# Patient Record
Sex: FEMALE | Race: ASIAN | NOT HISPANIC OR LATINO
[De-identification: names, ages, dates, MRNs, and addresses within clinical notes are randomized per-mention and may not be internally consistent; named-entity substitution may affect disease eponyms.]

---

## 2017-02-08 ENCOUNTER — APPOINTMENT (OUTPATIENT)
Dept: CARDIOLOGY | Facility: CLINIC | Age: 82
End: 2017-02-08

## 2017-02-08 ENCOUNTER — NON-APPOINTMENT (OUTPATIENT)
Age: 82
End: 2017-02-08

## 2017-02-08 VITALS
RESPIRATION RATE: 17 BRPM | SYSTOLIC BLOOD PRESSURE: 117 MMHG | OXYGEN SATURATION: 99 % | HEART RATE: 111 BPM | WEIGHT: 128 LBS | TEMPERATURE: 97.7 F | HEIGHT: 61 IN | BODY MASS INDEX: 24.17 KG/M2 | DIASTOLIC BLOOD PRESSURE: 78 MMHG

## 2017-02-28 ENCOUNTER — NON-APPOINTMENT (OUTPATIENT)
Age: 82
End: 2017-02-28

## 2017-02-28 ENCOUNTER — APPOINTMENT (OUTPATIENT)
Dept: CARDIOLOGY | Facility: CLINIC | Age: 82
End: 2017-02-28

## 2017-02-28 ENCOUNTER — MESSAGE (OUTPATIENT)
Age: 82
End: 2017-02-28

## 2017-02-28 VITALS
HEART RATE: 70 BPM | RESPIRATION RATE: 17 BRPM | WEIGHT: 124 LBS | TEMPERATURE: 97.9 F | OXYGEN SATURATION: 99 % | BODY MASS INDEX: 23.43 KG/M2 | DIASTOLIC BLOOD PRESSURE: 63 MMHG | SYSTOLIC BLOOD PRESSURE: 98 MMHG

## 2017-03-03 ENCOUNTER — OTHER (OUTPATIENT)
Age: 82
End: 2017-03-03

## 2017-04-17 ENCOUNTER — APPOINTMENT (OUTPATIENT)
Dept: CARDIOLOGY | Facility: CLINIC | Age: 82
End: 2017-04-17

## 2017-04-17 VITALS
HEART RATE: 74 BPM | BODY MASS INDEX: 24.17 KG/M2 | SYSTOLIC BLOOD PRESSURE: 106 MMHG | WEIGHT: 128 LBS | TEMPERATURE: 97.7 F | RESPIRATION RATE: 17 BRPM | DIASTOLIC BLOOD PRESSURE: 66 MMHG | HEIGHT: 61 IN | OXYGEN SATURATION: 100 %

## 2017-05-01 ENCOUNTER — MEDICATION RENEWAL (OUTPATIENT)
Age: 82
End: 2017-05-01

## 2017-06-06 ENCOUNTER — MEDICATION RENEWAL (OUTPATIENT)
Age: 82
End: 2017-06-06

## 2017-07-05 ENCOUNTER — APPOINTMENT (OUTPATIENT)
Dept: CARDIOLOGY | Facility: CLINIC | Age: 82
End: 2017-07-05

## 2017-07-05 VITALS
BODY MASS INDEX: 23.43 KG/M2 | OXYGEN SATURATION: 98 % | TEMPERATURE: 97.9 F | SYSTOLIC BLOOD PRESSURE: 109 MMHG | RESPIRATION RATE: 18 BRPM | WEIGHT: 124 LBS | HEART RATE: 63 BPM | DIASTOLIC BLOOD PRESSURE: 67 MMHG

## 2017-10-07 ENCOUNTER — APPOINTMENT (OUTPATIENT)
Dept: CARDIOLOGY | Facility: CLINIC | Age: 82
End: 2017-10-07
Payer: MEDICARE

## 2017-10-07 VITALS
OXYGEN SATURATION: 98 % | BODY MASS INDEX: 23.43 KG/M2 | TEMPERATURE: 97.5 F | WEIGHT: 124 LBS | RESPIRATION RATE: 18 BRPM | HEART RATE: 66 BPM | SYSTOLIC BLOOD PRESSURE: 98 MMHG | DIASTOLIC BLOOD PRESSURE: 62 MMHG

## 2017-10-07 PROCEDURE — 99214 OFFICE O/P EST MOD 30 MIN: CPT

## 2017-10-16 ENCOUNTER — MEDICATION RENEWAL (OUTPATIENT)
Age: 82
End: 2017-10-16

## 2018-02-10 ENCOUNTER — APPOINTMENT (OUTPATIENT)
Dept: CARDIOLOGY | Facility: CLINIC | Age: 83
End: 2018-02-10
Payer: MEDICARE

## 2018-02-10 ENCOUNTER — NON-APPOINTMENT (OUTPATIENT)
Age: 83
End: 2018-02-10

## 2018-02-10 VITALS
DIASTOLIC BLOOD PRESSURE: 69 MMHG | OXYGEN SATURATION: 98 % | RESPIRATION RATE: 17 BRPM | WEIGHT: 128 LBS | SYSTOLIC BLOOD PRESSURE: 108 MMHG | BODY MASS INDEX: 24.19 KG/M2 | TEMPERATURE: 97.9 F | HEART RATE: 82 BPM

## 2018-02-10 PROCEDURE — 99214 OFFICE O/P EST MOD 30 MIN: CPT

## 2018-02-10 PROCEDURE — 93000 ELECTROCARDIOGRAM COMPLETE: CPT

## 2018-02-10 PROCEDURE — 93306 TTE W/DOPPLER COMPLETE: CPT

## 2018-02-10 RX ORDER — ALENDRONATE SODIUM 70 MG/1
70 TABLET ORAL
Qty: 12 | Refills: 0 | Status: ACTIVE | COMMUNITY
Start: 2017-11-03

## 2018-06-12 ENCOUNTER — APPOINTMENT (OUTPATIENT)
Dept: CARDIOLOGY | Facility: CLINIC | Age: 83
End: 2018-06-12
Payer: MEDICARE

## 2018-06-12 VITALS
RESPIRATION RATE: 17 BRPM | TEMPERATURE: 97.5 F | SYSTOLIC BLOOD PRESSURE: 107 MMHG | BODY MASS INDEX: 24.19 KG/M2 | WEIGHT: 128 LBS | OXYGEN SATURATION: 100 % | HEART RATE: 83 BPM | DIASTOLIC BLOOD PRESSURE: 69 MMHG

## 2018-06-12 PROCEDURE — 99214 OFFICE O/P EST MOD 30 MIN: CPT

## 2018-10-09 ENCOUNTER — APPOINTMENT (OUTPATIENT)
Dept: CARDIOLOGY | Facility: CLINIC | Age: 83
End: 2018-10-09
Payer: MEDICARE

## 2018-10-09 ENCOUNTER — NON-APPOINTMENT (OUTPATIENT)
Age: 83
End: 2018-10-09

## 2018-10-09 VITALS
SYSTOLIC BLOOD PRESSURE: 108 MMHG | DIASTOLIC BLOOD PRESSURE: 68 MMHG | HEART RATE: 67 BPM | OXYGEN SATURATION: 96 % | RESPIRATION RATE: 17 BRPM | WEIGHT: 130 LBS | TEMPERATURE: 98.1 F | BODY MASS INDEX: 24.56 KG/M2

## 2018-10-09 PROCEDURE — 93000 ELECTROCARDIOGRAM COMPLETE: CPT

## 2018-10-09 PROCEDURE — 99214 OFFICE O/P EST MOD 30 MIN: CPT

## 2019-02-09 NOTE — REASON FOR VISIT
[Follow-Up - Clinic] : a clinic follow-up of [Atrial Fibrillation] : atrial fibrillation [Coronary Artery Disease] : coronary artery disease [Heart Failure] : congestive heart failure [Prosthetic Valve] : a prosthetic valve

## 2019-02-13 ENCOUNTER — APPOINTMENT (OUTPATIENT)
Dept: CARDIOLOGY | Facility: CLINIC | Age: 84
End: 2019-02-13
Payer: MEDICARE

## 2019-02-13 VITALS
SYSTOLIC BLOOD PRESSURE: 99 MMHG | DIASTOLIC BLOOD PRESSURE: 61 MMHG | RESPIRATION RATE: 16 BRPM | OXYGEN SATURATION: 100 % | WEIGHT: 131 LBS | BODY MASS INDEX: 24.75 KG/M2 | HEART RATE: 68 BPM

## 2019-02-13 PROCEDURE — 99214 OFFICE O/P EST MOD 30 MIN: CPT

## 2019-06-11 NOTE — PHYSICAL EXAM
[General Appearance - Well Developed] : well developed [Normal Appearance] : normal appearance [Well Groomed] : well groomed [General Appearance - Well Nourished] : well nourished [No Deformities] : no deformities [General Appearance - In No Acute Distress] : no acute distress [Normal Conjunctiva] : the conjunctiva exhibited no abnormalities [Eyelids - No Xanthelasma] : the eyelids demonstrated no xanthelasmas [Normal Oral Mucosa] : normal oral mucosa [No Oral Pallor] : no oral pallor [No Oral Cyanosis] : no oral cyanosis [Normal Jugular Venous A Waves Present] : normal jugular venous A waves present [Normal Jugular Venous V Waves Present] : normal jugular venous V waves present [No Jugular Venous Sandoval A Waves] : no jugular venous sandoval A waves [Respiration, Rhythm And Depth] : normal respiratory rhythm and effort [Exaggerated Use Of Accessory Muscles For Inspiration] : no accessory muscle use [Bibasilar Rales/Crackles] : bibasilar rales [Heart Sounds] : normal S1 and S2 [Arterial Pulses Normal] : the arterial pulses were normal [Edema] : no peripheral edema present [Tachycardic ___] : the heart rate was tachycardic at [unfilled] bpm [Abdomen Soft] : soft [Abdomen Tenderness] : non-tender [Abdomen Mass (___ Cm)] : no abdominal mass palpated [Abnormal Walk] : normal gait [Gait - Sufficient For Exercise Testing] : the gait was sufficient for exercise testing [Nail Clubbing] : no clubbing of the fingernails [Cyanosis, Localized] : no localized cyanosis [Petechial Hemorrhages (___cm)] : no petechial hemorrhages [] : no ischemic changes [Oriented To Time, Place, And Person] : oriented to person, place, and time [Affect] : the affect was normal [Mood] : the mood was normal [No Anxiety] : not feeling anxious [FreeTextEntry1] : 2/6 LOLA

## 2019-06-11 NOTE — HISTORY OF PRESENT ILLNESS
[FreeTextEntry1] : 85-year-old female with CAD s/p MI, severe MR s/p bioprosthetic MVR on 8/4/15, and AFIB presents for followup.  Patient was last seen on 10/9/18 for palpitations.  She was advised to resume Metoprolol ER 25 mg.  She is on Metoprolol ER 25 mg and Propafenone for PAF and Eliquis 2.5 mg BID for stroke prevention.  She is on ASA and Simvastatin for CAD.  She takes Lasix and KCL for pleural effusion.  Patient reports only getting palpitations when she is doing housework. Patient denies CP. Patient denies SOB. She reports easy bruising. Excision Depth: adipose tissue

## 2019-06-11 NOTE — DISCUSSION/SUMMARY
[FreeTextEntry1] : 85-year-old female with CAD s/p MI, severe MR s/p bioprosthetic MVR on 8/4/15, and AFIB presents for followup.  Patient was last seen on 10/9/18 for palpitations.  She was advised to resume Metoprolol ER 25 mg.  She is on Metoprolol ER 25 mg and Propafenone for PAF and Eliquis 2.5 mg BID for stroke prevention.  She is on ASA and Simvastatin for CAD.  She takes Lasix and KCL for pleural effusion.  Patient reports only getting palpitations when she is doing housework. Patient denies CP. Patient denies SOB. She reports easy bruising.\par \par (1) PAF, palpitations - I advised patient to continue Metoprolol ER 25 mg and Propafenone.  Her [id number] score is 4 (age>75, CAD, female gender).  She should continue Eliquis 2.5 mg BID for stroke prevention. \par \par (2) CAD s/p MI, LV dysfunction, LV enlargement - Patient is stable.  I advised her to continue ASA (reduce to QOD because of easy bruising) and Simvastatin.  I will consider adding an ARB in the future.\par \par (3) Pleural effusion, chronic - Patient is stable. I advised her to continue Lasix and KCL.\par \par (4) Severe MR s/p bioprosthetic MVR on 8/4/15 - Patient is stable.  \par \par (5) Followup - 4 months.

## 2019-06-12 ENCOUNTER — APPOINTMENT (OUTPATIENT)
Dept: CARDIOLOGY | Facility: CLINIC | Age: 84
End: 2019-06-12
Payer: MEDICARE

## 2019-06-12 ENCOUNTER — NON-APPOINTMENT (OUTPATIENT)
Age: 84
End: 2019-06-12

## 2019-06-12 VITALS
OXYGEN SATURATION: 99 % | TEMPERATURE: 97.9 F | WEIGHT: 132 LBS | HEART RATE: 70 BPM | RESPIRATION RATE: 17 BRPM | DIASTOLIC BLOOD PRESSURE: 62 MMHG | SYSTOLIC BLOOD PRESSURE: 102 MMHG | BODY MASS INDEX: 24.94 KG/M2

## 2019-06-12 DIAGNOSIS — E78.5 HYPERLIPIDEMIA, UNSPECIFIED: ICD-10-CM

## 2019-06-12 PROCEDURE — 99214 OFFICE O/P EST MOD 30 MIN: CPT

## 2019-06-12 PROCEDURE — 93306 TTE W/DOPPLER COMPLETE: CPT

## 2019-06-12 PROCEDURE — 93000 ELECTROCARDIOGRAM COMPLETE: CPT

## 2019-06-29 PROBLEM — E78.5 HLD (HYPERLIPIDEMIA): Status: ACTIVE | Noted: 2019-06-29

## 2019-08-11 ENCOUNTER — INPATIENT (INPATIENT)
Facility: HOSPITAL | Age: 84
LOS: 3 days | Discharge: HOME | DRG: 308 | End: 2019-08-15
Attending: FAMILY MEDICINE | Admitting: INTERNAL MEDICINE
Payer: COMMERCIAL

## 2019-08-11 VITALS — WEIGHT: 134.92 LBS | HEIGHT: 61 IN

## 2019-08-11 DIAGNOSIS — R00.1 BRADYCARDIA, UNSPECIFIED: ICD-10-CM

## 2019-08-11 DIAGNOSIS — Z98.890 OTHER SPECIFIED POSTPROCEDURAL STATES: Chronic | ICD-10-CM

## 2019-08-11 DIAGNOSIS — Z95.1 PRESENCE OF AORTOCORONARY BYPASS GRAFT: Chronic | ICD-10-CM

## 2019-08-11 DIAGNOSIS — I50.9 HEART FAILURE, UNSPECIFIED: ICD-10-CM

## 2019-08-11 DIAGNOSIS — Z29.9 ENCOUNTER FOR PROPHYLACTIC MEASURES, UNSPECIFIED: ICD-10-CM

## 2019-08-11 DIAGNOSIS — E78.00 PURE HYPERCHOLESTEROLEMIA, UNSPECIFIED: ICD-10-CM

## 2019-08-11 DIAGNOSIS — I48.91 UNSPECIFIED ATRIAL FIBRILLATION: ICD-10-CM

## 2019-08-11 DIAGNOSIS — R55 SYNCOPE AND COLLAPSE: ICD-10-CM

## 2019-08-11 DIAGNOSIS — I10 ESSENTIAL (PRIMARY) HYPERTENSION: ICD-10-CM

## 2019-08-11 LAB
ALBUMIN SERPL ELPH-MCNC: 2.7 G/DL — LOW (ref 3.3–5)
ALP SERPL-CCNC: 95 U/L — SIGNIFICANT CHANGE UP (ref 40–120)
ALT FLD-CCNC: 18 U/L — SIGNIFICANT CHANGE UP (ref 12–78)
ANION GAP SERPL CALC-SCNC: 8 MMOL/L — SIGNIFICANT CHANGE UP (ref 5–17)
APTT BLD: 37.8 SEC — HIGH (ref 28.5–37)
AST SERPL-CCNC: 32 U/L — SIGNIFICANT CHANGE UP (ref 15–37)
BILIRUB SERPL-MCNC: 0.6 MG/DL — SIGNIFICANT CHANGE UP (ref 0.2–1.2)
BUN SERPL-MCNC: 31 MG/DL — HIGH (ref 7–23)
CALCIUM SERPL-MCNC: 7.4 MG/DL — LOW (ref 8.5–10.1)
CHLORIDE SERPL-SCNC: 111 MMOL/L — HIGH (ref 96–108)
CK MB BLD-MCNC: <1.8 % — SIGNIFICANT CHANGE UP (ref 0–3.5)
CK MB CFR SERPL CALC: <1 NG/ML — SIGNIFICANT CHANGE UP (ref 0–3.6)
CK SERPL-CCNC: 56 U/L — SIGNIFICANT CHANGE UP (ref 26–192)
CO2 SERPL-SCNC: 25 MMOL/L — SIGNIFICANT CHANGE UP (ref 22–31)
CREAT SERPL-MCNC: 1.4 MG/DL — HIGH (ref 0.5–1.3)
GLUCOSE SERPL-MCNC: 131 MG/DL — HIGH (ref 70–99)
HCT VFR BLD CALC: 34 % — LOW (ref 34.5–45)
HGB BLD-MCNC: 10.9 G/DL — LOW (ref 11.5–15.5)
INR BLD: 1.68 RATIO — HIGH (ref 0.88–1.16)
MCHC RBC-ENTMCNC: 28.2 PG — SIGNIFICANT CHANGE UP (ref 27–34)
MCHC RBC-ENTMCNC: 32.1 GM/DL — SIGNIFICANT CHANGE UP (ref 32–36)
MCV RBC AUTO: 88.1 FL — SIGNIFICANT CHANGE UP (ref 80–100)
NRBC # BLD: 0 /100 WBCS — SIGNIFICANT CHANGE UP (ref 0–0)
PLATELET # BLD AUTO: 152 K/UL — SIGNIFICANT CHANGE UP (ref 150–400)
POTASSIUM SERPL-MCNC: 3.8 MMOL/L — SIGNIFICANT CHANGE UP (ref 3.5–5.3)
POTASSIUM SERPL-SCNC: 3.8 MMOL/L — SIGNIFICANT CHANGE UP (ref 3.5–5.3)
PROT SERPL-MCNC: 7.1 G/DL — SIGNIFICANT CHANGE UP (ref 6–8.3)
PROTHROM AB SERPL-ACNC: 19.5 SEC — HIGH (ref 10–12.9)
RBC # BLD: 3.86 M/UL — SIGNIFICANT CHANGE UP (ref 3.8–5.2)
RBC # FLD: 15 % — HIGH (ref 10.3–14.5)
SODIUM SERPL-SCNC: 144 MMOL/L — SIGNIFICANT CHANGE UP (ref 135–145)
TROPONIN I SERPL-MCNC: <.015 NG/ML — SIGNIFICANT CHANGE UP (ref 0.01–0.04)
WBC # BLD: 4.27 K/UL — SIGNIFICANT CHANGE UP (ref 3.8–10.5)
WBC # FLD AUTO: 4.27 K/UL — SIGNIFICANT CHANGE UP (ref 3.8–10.5)

## 2019-08-11 PROCEDURE — 71045 X-RAY EXAM CHEST 1 VIEW: CPT | Mod: 26

## 2019-08-11 PROCEDURE — 99291 CRITICAL CARE FIRST HOUR: CPT

## 2019-08-11 PROCEDURE — 99223 1ST HOSP IP/OBS HIGH 75: CPT | Mod: GC

## 2019-08-11 PROCEDURE — 93010 ELECTROCARDIOGRAM REPORT: CPT | Mod: 76

## 2019-08-11 RX ORDER — ASPIRIN/CALCIUM CARB/MAGNESIUM 324 MG
81 TABLET ORAL DAILY
Refills: 0 | Status: DISCONTINUED | OUTPATIENT
Start: 2019-08-11 | End: 2019-08-15

## 2019-08-11 RX ORDER — FUROSEMIDE 40 MG
40 TABLET ORAL DAILY
Refills: 0 | Status: DISCONTINUED | OUTPATIENT
Start: 2019-08-11 | End: 2019-08-15

## 2019-08-11 RX ORDER — GLUCAGON INJECTION, SOLUTION 0.5 MG/.1ML
1 INJECTION, SOLUTION SUBCUTANEOUS ONCE
Refills: 0 | Status: COMPLETED | OUTPATIENT
Start: 2019-08-11 | End: 2019-08-11

## 2019-08-11 RX ORDER — SODIUM CHLORIDE 9 MG/ML
1000 INJECTION INTRAMUSCULAR; INTRAVENOUS; SUBCUTANEOUS ONCE
Refills: 0 | Status: COMPLETED | OUTPATIENT
Start: 2019-08-11 | End: 2019-08-11

## 2019-08-11 RX ORDER — SODIUM CHLORIDE 9 MG/ML
500 INJECTION INTRAMUSCULAR; INTRAVENOUS; SUBCUTANEOUS ONCE
Refills: 0 | Status: COMPLETED | OUTPATIENT
Start: 2019-08-11 | End: 2019-08-11

## 2019-08-11 RX ORDER — SIMVASTATIN 20 MG/1
20 TABLET, FILM COATED ORAL AT BEDTIME
Refills: 0 | Status: DISCONTINUED | OUTPATIENT
Start: 2019-08-11 | End: 2019-08-15

## 2019-08-11 RX ADMIN — SODIUM CHLORIDE 500 MILLILITER(S): 9 INJECTION INTRAMUSCULAR; INTRAVENOUS; SUBCUTANEOUS at 11:30

## 2019-08-11 RX ADMIN — GLUCAGON INJECTION, SOLUTION 1 MILLIGRAM(S): 0.5 INJECTION, SOLUTION SUBCUTANEOUS at 13:00

## 2019-08-11 RX ADMIN — SODIUM CHLORIDE 1000 MILLILITER(S): 9 INJECTION INTRAMUSCULAR; INTRAVENOUS; SUBCUTANEOUS at 09:28

## 2019-08-11 RX ADMIN — SIMVASTATIN 20 MILLIGRAM(S): 20 TABLET, FILM COATED ORAL at 21:07

## 2019-08-11 RX ADMIN — SODIUM CHLORIDE 1000 MILLILITER(S): 9 INJECTION INTRAMUSCULAR; INTRAVENOUS; SUBCUTANEOUS at 10:49

## 2019-08-11 RX ADMIN — SODIUM CHLORIDE 500 MILLILITER(S): 9 INJECTION INTRAMUSCULAR; INTRAVENOUS; SUBCUTANEOUS at 10:48

## 2019-08-11 NOTE — ED ADULT NURSE NOTE - OBJECTIVE STATEMENT
BIBEMS from home. Present to ER for unresponsive. Pt was found by family in the couch unresponsive and foam in the mouth. EMS found pt bradycardic and gave 1/2 amp of atropine. Pt was given one liter of NS at EMS and 1/2 in ED. Pt was also given glucagon 1mg IVP. Pt had one episode of vomiting. Denies any other injury.

## 2019-08-11 NOTE — H&P ADULT - NSICDXPASTMEDICALHX_GEN_ALL_CORE_FT
PAST MEDICAL HISTORY:  Atrial fibrillation     CHF (congestive heart failure)     High cholesterol     HTN (hypertension)

## 2019-08-11 NOTE — H&P ADULT - NSHPSOCIALHISTORY_GEN_ALL_CORE
Lives at home with  and daughter's family. Independent of ADLs, ambulates without assistance. Never smoker. No alcohol or drug use.

## 2019-08-11 NOTE — ED PROVIDER NOTE - CRITICAL CARE PROVIDED
consult w/ pt's family directly relating to pts condition/documentation/additional history taking/interpretation of diagnostic studies/consultation with other physicians/direct patient care (not related to procedure)

## 2019-08-11 NOTE — H&P ADULT - PROBLEM SELECTOR PLAN 1
- S/p 1/2 amp atropine w/ rise in HR from mids 30s to 40s-50s. Continues to be symptomatic and hypotensive with HR 38.  - Hold home metoprolol and propafenone.  - Cardiology consulted (Dr. Ceron) - S/p 1/2 amp atropine w/ rise in HR from mids 30s to 40s-50s. Continues to be symptomatic and hypotensive with HR 38.  - Hold home metoprolol and propafenone.  - Continuous monitoring on telemetry   - Cardiology consulted (Dr. Ceron) - Unclear if patient has recent change in medications, bradycardia could be 2/2 medications  - S/p 1/2 amp atropine w/ rise in HR from mids 30s to 40s-50s. Continues to be symptomatic and hypotensive with HR 38. Will hold off on atropine for now.  - Hold home metoprolol and propafenone.  - Glucagon 1mg IV once for beta blocker reversal  - Continuous monitoring on telemetry   - Cardiology consulted (Dr. Ceron) - Unclear if patient has had recent change in medications, bradycardia likely 2/2 beta blocker and antiarrhythmic   - S/p 1/2 amp atropine w/ rise in HR from mids 30s to 40s-50s. Will hold off on atropine for now.  - Hold home metoprolol and propafenone.  - Glucagon 1mg IV once for beta blocker reversal  - Continuous monitoring on telemetry   - Cardiology consulted (Dr. Ceron) - Unclear if patient has had recent change in medications, bradycardia likely 2/2 beta blocker and antiarrhythmic.  - S/p 1/2 amp atropine w/ rise in HR from mids 30s to 40s-50s. Will hold off on atropine for now.  - Hold home metoprolol and propafenone.  - Glucagon 1mg IV once for beta blocker reversal  - Continuous monitoring on telemetry   - Cardiology consulted (Dr. Ceron)

## 2019-08-11 NOTE — H&P ADULT - NSHPPHYSICALEXAM_GEN_ALL_CORE
PHYSICAL EXAM:    Constitutional:   HEENT:  Respiratory:  Cardiovascular:  Gastrointestinal:  Extremities:  Vascular:  Neurological:  Skin: PHYSICAL EXAM:    Constitutional: Resting in bed with eyes closed. Answers questions appropriately.  HEENT: Atraumatic, normocephalic.  Respiratory: CTAB  Cardiovascular: Bradycardic rate, no m/r/g  Gastrointestinal: Soft, non-distended, non-tender to palpation  Extremities: No lower extremity edema  Vascular: Skin is dry, warm, well-perfused. Cap refill <3s.  Neurological: AOx3, moving all extremities spontaneously, no focal deficits.

## 2019-08-11 NOTE — CONSULT NOTE ADULT - ASSESSMENT
85 year old Chinese-speaking female with a past medical history of CHFpEF (last echo 2 months ago normal per family), reported CAD s/p CABG and mitral valve repair and aortic valve replacement in 2015, atrial fibrillation on eliquis,  HLD, and HTN, BIBEMS s/p developing weakness and fatigue, found to be in junctional bradycardia:    - Patient to be given glucagon in an effort to reverse Toprol  - Her BP is acceptable for now  - HOLD propafenone and Toprol.  Hopefully her heart rate will recover as these drugs wash out  - She does have significant conduction system disease on her EKG.  The possibility of permanent PPM was discussed with the family  - No indication for dopamine or TVP for now  - Rule out ACS with serial enzymes and EKGs  - Continue aspirin  - Continue eliquis 2.5 bid  - Continue statin drug  - Hold Lasix  - Propafenone not the best choice of anti arrhythmic drug in a patient with advanced age with structural heart disease. To be discontinued permanently  - Check 2D echo  - Needs continuous cardiac monitoring  - Zoll pads to anterior chest wall with back up pacing device at bedside  - Atropine at bedside  - To follow closely

## 2019-08-11 NOTE — CONSULT NOTE ADULT - SUBJECTIVE AND OBJECTIVE BOX
[facility] - Prisca London, Leti, Lottie, Jie, Tarun Nicholson  Office Number: [phone]    Initial Consult Note    CHIEF COMPLAINT: Patient is a 85y old  Female who presents with a chief complaint of Bradycardia        HPI:  85 year old Chinese-speaking female with a past medical history of CHFpEF (last echo 2 months ago normal per family), reported CAD s/p CABG and mitral valve repair and aortic valve replacement in 2015, atrial fibrillation on eliquis,  HLD, and HTN, BIBEMS s/p developing weakness and fatigue at 9:30AM. She was at her functional baseline early this morning, and then was subsequently found by family to be lying on the couch essentially unresponsive with pallor, cyanosis of her lips and foaming at the mouth. EMS was called. After several minutes, family was able to arouse her, and patient reported feeling dizzy and nauseous. As per chart review, EMS found patient  weak appearing, hypotensive and bradycardic, and patient was given 1/2 amp of atropine IV and heart rate improved from mid 30s to mid 40s-50s with subsequent rise in BP. Patient reports that she is still mildy dizzy, weak, and fatigued, but feeling better. She is unsure if she lost consciousness during the episode. She denies chest pain, shortness of breath, diaphoresis, fever, chills, nausea, abdominal pain, recent fall, recent change in medications.    ED vitals: T: 97.9, HR: 46 BP: 108/55 RR: 18 O2 saturation: initially 99 on nonrebreather then 100 on NC 2 L. In the ED patient was given: IVF bolus X2, glucagon 1mg IV. EKG: Wide QRS, RBBB, left posterior fascicle block, bifascicular block. One episode of vomiting with chest pain following glucagon administration. EKG unchanged. Labs notable for WBC 4.27, H/h 10.9/34, BUN/Cr 31/1.4 (baseline unknown), initial troponin <.015. CXR: Small left pleural effusion with basilar atelectasis or consolidation.      She is on Toprol and propafenone, prescribed by her private cardiologist. She last took both doses this AM.  She did not overdose per the family.        PAST MEDICAL & SURGICAL HISTORY:  Atrial fibrillation  CHF (congestive heart failure)  HTN (hypertension)  High cholesterol  H/O aortic valve replacement  S/P mitral valve repair  S/P CABG (coronary artery bypass graft)      SOCIAL HISTORY:  No tobacco, ethanol, or drug abuse.    FAMILY HISTORY:    No family history of acute MI or sudden cardiac death.    MEDICATIONS  (STANDING):  aspirin enteric coated 81 milliGRAM(s) Oral daily  furosemide    Tablet 40 milliGRAM(s) Oral daily  simvastatin 20 milliGRAM(s) Oral at bedtime        Allergies    No Known Allergies        REVIEW OF SYSTEMS:    All other review of systems is negative unless indicated above    VITAL SIGNS:   Vital Signs Last 24 Hrs  T(C): 36.6 (11 Aug 2019 12:52), Max: 36.6 (11 Aug 2019 10:14)  T(F): 97.8 (11 Aug 2019 12:52), Max: 97.9 (11 Aug 2019 10:14)  HR: 45 (11 Aug 2019 13:14) (38 - 46)  BP: 130/58 (11 Aug 2019 13:14) (97/53 - 130/58)  BP(mean): --  RR: 16 (11 Aug 2019 13:14) (16 - 18)  SpO2: 96% (11 Aug 2019 13:14) (96% - 100%)    I&O's Summary      On Exam:    Constitutional: NAD, lethargic  Lungs:  Non-labored, breath sounds are clear bilaterally, No wheezing, rales or rhonchi  Cardiovascular: RRR.  S1 and S2 positive.  Jaime.  1/6 systolic murmur  Gastrointestinal: Bowel Sounds present, soft, nontender.   Lymph: No peripheral edema. No cervical lymphadenopathy.  Neurological: Alert, no focal deficits.  Lethargic  Skin: No rashes or ulcers   Psych:  Unable to assess    LABS: All Labs Reviewed:                        10.9   4.27  )-----------( 152      ( 11 Aug 2019 11:45 )             34.0     11 Aug 2019 11:45    144    |  111    |  31     ----------------------------<  131    3.8     |  25     |  1.40     Ca    7.4        11 Aug 2019 11:45  Mg     2.3       11 Aug 2019 11:45    TPro  7.1    /  Alb  2.7    /  TBili  0.6    /  DBili  x      /  AST  32     /  ALT  18     /  AlkPhos  95     11 Aug 2019 11:45    PT/INR - ( 11 Aug 2019 11:45 )   PT: 19.5 sec;   INR: 1.68 ratio         PTT - ( 11 Aug 2019 11:45 )  PTT:37.8 sec  CARDIAC MARKERS ( 11 Aug 2019 11:45 )  <.015 ng/mL / x     / 56 U/L / x     / <1.0 ng/mL      Blood Culture:         RADIOLOGY:    EKG:  Junctional bradycardia with retrograde p waves.  RBBB.  LPFB.

## 2019-08-11 NOTE — H&P ADULT - PROBLEM SELECTOR PLAN 2
- S/p  1.5L NS boluses  - F/u CKMB, CK, troponins - S/p  1.5L NS boluses  - F/u CKMB, CK, troponins  - Continuous monitoring on telemetry - S/p  1.5L NS boluses  - F/u CKMB, CK, troponins to r/o ACS  - Continuous monitoring on telemetry - S/p  1.5L NS boluses  - CKMB <1, CPK <1.8, troponin <.015  - F/u troponins trends to r/o ACS  - Continuous monitoring on telemetry

## 2019-08-11 NOTE — H&P ADULT - ATTENDING COMMENTS
After glucagon dose, pt developed chest pain and vomited. After episode her chest pain resolved. HR did improve to the low 40's. Her bradycardia is likely s/e of propafenone. D/w family possible need to PPM if HR does not improve. Given her bradycardia, will avoid zofran and reglan for nausea. May need to consider Tigan if more glucagon is needed or nausea becomes intolerable.

## 2019-08-11 NOTE — ED ADULT TRIAGE NOTE - CHIEF COMPLAINT QUOTE
pt from home, c/o diff breathing, called ems, on arrival pt bradycardiac with n/v, given 0.5 mg atropine for bradycardia, ems now states ekg shows possible MI, # 20 right FA

## 2019-08-11 NOTE — ED ADULT NURSE REASSESSMENT NOTE - NS ED NURSE REASSESS COMMENT FT1
following glucagon administration pt became nauseaus/vomitited  /58 HR 45  O2 SAT 96% on 2 liter NC.   HR responded by increasing to 45.  Seen by Dr Flaco Mantilla to transfer pt.

## 2019-08-11 NOTE — H&P ADULT - HISTORY OF PRESENT ILLNESS
85 year old Chinese-speaking female with a past medical history of CHF (last echo 2 months ago normal per family), MI in s/p mitral valve replacement, aortic valve repair in 2015, HLD, and HTN,  BIBEMS, presented s/p developing weakness and fatigue at 9:30AM. She was at her functional baseline early this morning, and then was subsequently found by family to be laying on the couch and essentially unresponsive with cyanosis of her lips and foaming at the mouth. EMS was called. After several minutes, family was able to arouse her, and she reported feeling dizzy and nauseous. As per chart review, EMS found patient  weak appearing, hypotensive and bradycardic, and patient was given 1/2 amp of atropine IV and heart rate improved from mid 30s to mid 40s-50s with subsequent rise in BP. Patient reports that she is still mildy dizzy, weak, and fatigued, but feeling better.    ED vitals: T: 97.9, HR: 46 BP: 108/55 RR: 18 O2 saturation: initially 99 on nonrebreather then 100 on NC 2 L. In the ED patient was given: IVF bolus X2. Labs notable for    CXR: Small left pleural effusion with basilar atelectasis or consolidation  EKG:     Patient was evaluated by cardio consult ( _________) in the ED. 85 year old Chinese-speaking female with a past medical history of CHF (last echo 2 months ago normal per family), MI in s/p mitral valve replacement, aortic valve repair in 2015, atrial fibrillation on eliquis,  HLD, and HTN, BIBEMS s/p developing weakness and fatigue at 9:30AM. She was at her functional baseline early this morning, and then was subsequently found by family to be lying on the couch essentially unresponsive with pallor, cyanosis of her lips and foaming at the mouth. EMS was called. After several minutes, family was able to arouse her, and patient reported feeling dizzy and nauseous. As per chart review, EMS found patient  weak appearing, hypotensive and bradycardic, and patient was given 1/2 amp of atropine IV and heart rate improved from mid 30s to mid 40s-50s with subsequent rise in BP. Patient reports that she is still mildy dizzy, weak, and fatigued, but feeling better. She is unsure if she lost consciousness during the episode. She denies chest pain, shortness of breath, diaphoresis, fever, chills, nausea, abdominal pain, recent fall, recent change in medications.    ED vitals: T: 97.9, HR: 46 BP: 108/55 RR: 18 O2 saturation: initially 99 on nonrebreather then 100 on NC 2 L. In the ED patient was given: IVF bolus X2. Labs notable for ***   CXR: Small left pleural effusion with basilar atelectasis or consolidation  EKG: Wide QRS, RBBB, left posterior fascicle block, bifascicular block.    Patient was evaluated by cardio consult (Dr. Ceron) in the ED. 85 year old Chinese-speaking female with a past medical history of CHF (last echo 2 months ago normal per family), MI in s/p mitral valve replacement, aortic valve repair in 2015, atrial fibrillation on eliquis,  HLD, and HTN, BIBEMS s/p developing weakness and fatigue at 9:30AM. She was at her functional baseline early this morning, and then was subsequently found by family to be lying on the couch essentially unresponsive with pallor, cyanosis of her lips and foaming at the mouth. EMS was called. After several minutes, family was able to arouse her, and patient reported feeling dizzy and nauseous. As per chart review, EMS found patient  weak appearing, hypotensive and bradycardic, and patient was given 1/2 amp of atropine IV and heart rate improved from mid 30s to mid 40s-50s with subsequent rise in BP. Patient reports that she is still mildy dizzy, weak, and fatigued, but feeling better. She is unsure if she lost consciousness during the episode. She denies chest pain, shortness of breath, diaphoresis, fever, chills, nausea, abdominal pain, recent fall, recent change in medications.    ED vitals: T: 97.9, HR: 46 BP: 108/55 RR: 18 O2 saturation: initially 99 on nonrebreather then 100 on NC 2 L. In the ED patient was given: IVF bolus X2. Labs notable for WBC 4.27, H/h 10.9/34   CXR: Small left pleural effusion with basilar atelectasis or consolidation  EKG: Wide QRS, RBBB, left posterior fascicle block, bifascicular block.    Patient was evaluated by cardio consult (Dr. Ceron) in the ED. 85 year old Chinese-speaking female with a past medical history of CHF (last echo 2 months ago normal per family), MI in s/p mitral valve replacement, aortic valve repair in 2015, atrial fibrillation on eliquis,  HLD, and HTN, BIBEMS s/p developing weakness and fatigue at 9:30AM. She was at her functional baseline early this morning, and then was subsequently found by family to be lying on the couch essentially unresponsive with pallor, cyanosis of her lips and foaming at the mouth. EMS was called. After several minutes, family was able to arouse her, and patient reported feeling dizzy and nauseous. As per chart review, EMS found patient  weak appearing, hypotensive and bradycardic, and patient was given 1/2 amp of atropine IV and heart rate improved from mid 30s to mid 40s-50s with subsequent rise in BP. Patient reports that she is still mildy dizzy, weak, and fatigued, but feeling better. She is unsure if she lost consciousness during the episode. She denies chest pain, shortness of breath, diaphoresis, fever, chills, nausea, abdominal pain, recent fall, recent change in medications.    ED vitals: T: 97.9, HR: 46 BP: 108/55 RR: 18 O2 saturation: initially 99 on nonrebreather then 100 on NC 2 L. In the ED patient was given: IVF bolus X2, glucagon 1mg IV. EKG: Wide QRS, RBBB, left posterior fascicle block, bifascicular block. One episode of vomiting with chest pain following glucagon administration. EKG unchanged. Labs notable for WBC 4.27, H/h 10.9/34, BUN/Cr 31/1.4 (baseline unknown), initial troponin <.015. CXR: Small left pleural effusion with basilar atelectasis or consolidation.     Patient was evaluated by cardio consult (Dr. Ceron) in the ED.

## 2019-08-11 NOTE — H&P ADULT - NSHPREVIEWOFSYSTEMS_GEN_ALL_CORE
CONSTITUTIONAL: Positive for fatigue, weakness. denies fever, chills  CARDIOVASCULAR: denies chest pain, chest pressure, palpitations  RESPIRATORY: denies shortness of breath  GASTROINTESTINAL: denies nausea, vomiting, abdominal pain  GENITOURINARY: denies dysuria, discharge  NEUROLOGICAL: Positive for dizziness. denies numbness, headache, focal weakness  MUSCULOSKELETAL: denies new joint pain, muscle aches

## 2019-08-11 NOTE — ED PROVIDER NOTE - CONSTITUTIONAL, MLM
normal... Weak appearing  female, overweight, well nourished, awake, alert, oriented to person, place, time/situation and in no apparent distress.

## 2019-08-11 NOTE — ED PROVIDER NOTE - OBJECTIVE STATEMENT
86 yo  female with H/O CHF (congestive heart failure)    High cholesterol    HTN (hypertension), well this morning when she suddenly developed weakness, fatigue, ALOC and was seen to look somewhat pale. EMS was contacted and found patient weak appearing, hypotensive and bradycardic. 1/2 amp of atropine was administered IV and heart rate went from mid 30s to mid 40s-50s with subsequent rise in BP and return to patient normal baseline. At this time patient denies any nausea, vomiting, headache, chest pain, SOB or palpitations.

## 2019-08-11 NOTE — H&P ADULT - ASSESSMENT
85 year old Chinese-speaking female with a past medical history of CHF (last echo 2 months ago normal per family), MI in s/p mitral valve replacement, aortic valve repair in 2015, atrial fibrillation on eliquis,  HLD, and HTN admitted with pre-syncopal episode and symptomatic bradycardia with hypotension. 85 year old Chinese-speaking female with a past medical history of CHF (last echo 2 months ago normal per family), MI in s/p mitral valve replacement, aortic valve repair in 2015, atrial fibrillation on eliquis,  HLD, and HTN admitted with pre-syncopal episode and symptomatic bradycardia with hypotension likely 2/2 to medications.

## 2019-08-12 DIAGNOSIS — D72.829 ELEVATED WHITE BLOOD CELL COUNT, UNSPECIFIED: ICD-10-CM

## 2019-08-12 LAB
ANION GAP SERPL CALC-SCNC: 7 MMOL/L — SIGNIFICANT CHANGE UP (ref 5–17)
BASOPHILS # BLD AUTO: 0.02 K/UL — SIGNIFICANT CHANGE UP (ref 0–0.2)
BASOPHILS NFR BLD AUTO: 0.2 % — SIGNIFICANT CHANGE UP (ref 0–2)
BUN SERPL-MCNC: 43 MG/DL — HIGH (ref 7–23)
CALCIUM SERPL-MCNC: 8.1 MG/DL — LOW (ref 8.5–10.1)
CHLORIDE SERPL-SCNC: 110 MMOL/L — HIGH (ref 96–108)
CK MB BLD-MCNC: 0.8 % — SIGNIFICANT CHANGE UP (ref 0–3.5)
CK MB BLD-MCNC: <1 % — SIGNIFICANT CHANGE UP (ref 0–3.5)
CK MB CFR SERPL CALC: 1.1 NG/ML — SIGNIFICANT CHANGE UP (ref 0–3.6)
CK MB CFR SERPL CALC: <1 NG/ML — SIGNIFICANT CHANGE UP (ref 0–3.6)
CK SERPL-CCNC: 135 U/L — SIGNIFICANT CHANGE UP (ref 26–192)
CK SERPL-CCNC: 99 U/L — SIGNIFICANT CHANGE UP (ref 26–192)
CO2 SERPL-SCNC: 25 MMOL/L — SIGNIFICANT CHANGE UP (ref 22–31)
CREAT SERPL-MCNC: 1.4 MG/DL — HIGH (ref 0.5–1.3)
EOSINOPHIL # BLD AUTO: 0.03 K/UL — SIGNIFICANT CHANGE UP (ref 0–0.5)
EOSINOPHIL NFR BLD AUTO: 0.3 % — SIGNIFICANT CHANGE UP (ref 0–6)
GLUCOSE SERPL-MCNC: 104 MG/DL — HIGH (ref 70–99)
HCT VFR BLD CALC: 32.2 % — LOW (ref 34.5–45)
HGB BLD-MCNC: 10.4 G/DL — LOW (ref 11.5–15.5)
IMM GRANULOCYTES NFR BLD AUTO: 0.6 % — SIGNIFICANT CHANGE UP (ref 0–1.5)
LYMPHOCYTES # BLD AUTO: 1.24 K/UL — SIGNIFICANT CHANGE UP (ref 1–3.3)
LYMPHOCYTES # BLD AUTO: 10.5 % — LOW (ref 13–44)
MCHC RBC-ENTMCNC: 27.9 PG — SIGNIFICANT CHANGE UP (ref 27–34)
MCHC RBC-ENTMCNC: 32.3 GM/DL — SIGNIFICANT CHANGE UP (ref 32–36)
MCV RBC AUTO: 86.3 FL — SIGNIFICANT CHANGE UP (ref 80–100)
MONOCYTES # BLD AUTO: 1.01 K/UL — HIGH (ref 0–0.9)
MONOCYTES NFR BLD AUTO: 8.5 % — SIGNIFICANT CHANGE UP (ref 2–14)
NEUTROPHILS # BLD AUTO: 9.46 K/UL — HIGH (ref 1.8–7.4)
NEUTROPHILS NFR BLD AUTO: 79.9 % — HIGH (ref 43–77)
NRBC # BLD: 0 /100 WBCS — SIGNIFICANT CHANGE UP (ref 0–0)
PLATELET # BLD AUTO: 137 K/UL — LOW (ref 150–400)
POTASSIUM SERPL-MCNC: 4.6 MMOL/L — SIGNIFICANT CHANGE UP (ref 3.5–5.3)
POTASSIUM SERPL-SCNC: 4.6 MMOL/L — SIGNIFICANT CHANGE UP (ref 3.5–5.3)
RBC # BLD: 3.73 M/UL — LOW (ref 3.8–5.2)
RBC # FLD: 15.1 % — HIGH (ref 10.3–14.5)
SODIUM SERPL-SCNC: 142 MMOL/L — SIGNIFICANT CHANGE UP (ref 135–145)
TROPONIN I SERPL-MCNC: 0.04 NG/ML — SIGNIFICANT CHANGE UP (ref 0.01–0.04)
TROPONIN I SERPL-MCNC: 0.04 NG/ML — SIGNIFICANT CHANGE UP (ref 0.01–0.04)
WBC # BLD: 11.83 K/UL — HIGH (ref 3.8–10.5)
WBC # FLD AUTO: 11.83 K/UL — HIGH (ref 3.8–10.5)

## 2019-08-12 PROCEDURE — 99233 SBSQ HOSP IP/OBS HIGH 50: CPT

## 2019-08-12 PROCEDURE — 99233 SBSQ HOSP IP/OBS HIGH 50: CPT | Mod: GC

## 2019-08-12 RX ORDER — ENOXAPARIN SODIUM 100 MG/ML
60 INJECTION SUBCUTANEOUS DAILY
Refills: 0 | Status: DISCONTINUED | OUTPATIENT
Start: 2019-08-12 | End: 2019-08-15

## 2019-08-12 RX ORDER — ENOXAPARIN SODIUM 100 MG/ML
60 INJECTION SUBCUTANEOUS EVERY 12 HOURS
Refills: 0 | Status: DISCONTINUED | OUTPATIENT
Start: 2019-08-12 | End: 2019-08-12

## 2019-08-12 RX ORDER — APIXABAN 2.5 MG/1
1 TABLET, FILM COATED ORAL
Qty: 0 | Refills: 0 | DISCHARGE

## 2019-08-12 RX ORDER — SIMVASTATIN 20 MG/1
1 TABLET, FILM COATED ORAL
Qty: 0 | Refills: 0 | DISCHARGE

## 2019-08-12 RX ORDER — FUROSEMIDE 40 MG
1 TABLET ORAL
Qty: 0 | Refills: 0 | DISCHARGE

## 2019-08-12 RX ORDER — METOPROLOL TARTRATE 50 MG
1 TABLET ORAL
Qty: 0 | Refills: 0 | DISCHARGE

## 2019-08-12 RX ADMIN — SIMVASTATIN 20 MILLIGRAM(S): 20 TABLET, FILM COATED ORAL at 21:17

## 2019-08-12 RX ADMIN — ENOXAPARIN SODIUM 60 MILLIGRAM(S): 100 INJECTION SUBCUTANEOUS at 11:39

## 2019-08-12 RX ADMIN — Medication 81 MILLIGRAM(S): at 11:39

## 2019-08-12 NOTE — PROGRESS NOTE ADULT - ASSESSMENT
85 year old Chinese-speaking female with a past medical history of CHFpEF (last echo 2 months ago normal per family), reported CAD s/p CABG and mitral valve repair and aortic valve replacement in 2015, atrial fibrillation on eliquis,  HLD, and HTN, BIBEMS s/p developing weakness and fatigue, found to be in junctional bradycardia:      Junctional bradycardia  - Patient s/p glucagon in an effort to reverse Toprol  - Her BP is acceptable for now still soft at times  - Continue HOLD propafenone and Toprol.  Hopefully her heart rate will recover as these drugs wash out  - She does have significant conduction system disease on her EKG.  The possibility of permanent PPM was discussed with the family  - Zoll pads to anterior chest wall with back up pacing device at bedside  - Atropine at bedside  - No indication for dopamine or TVP for now  - Needs continuous cardiac monitoring    CAD s/p CAbg w/ AVR  - CE negative x1 continue  serial enzymes and EKGs  - Continue aspirin  - Continue statin drug    PAfib   - Currently not on any AC would cover on full dose lovenox since eliquis is being held  - Propafenone not the best choice of anti arrhythmic drug in a patient with advanced age with structural heart disease. To be discontinued permanently    HFpEF  - CW Lasix  - Check 2D echo    -Monitor and replete lytes, keep K>4 and Mg >2  - Further cardiac workup will depend on clinical course.   - All other workup per primary team  Thank you for the consult  Will continue to follow  Marques Batista [facility]  Cardiology   Spectra #[phone] 85 year old Chinese-speaking female with a past medical history of CHFpEF (last echo 2 months ago normal per family), reported CAD s/p CABG and mitral valve repair and aortic valve replacement in 2015, atrial fibrillation on eliquis,  HLD, and HTN, BIBEMS s/p developing weakness and fatigue, found to be in junctional bradycardia:      Junctional bradycardia  - Patient s/p glucagon in an effort to reverse Toprol  - Her BP is acceptable for now still soft at times  - Continue HOLD propafenone and Toprol.  Hopefully her heart rate will recover as these drugs wash out.  Her HR yesterday was 37.  Today is is mostly high 40s.  She has sinus ann-marie and junctional, alternating.  Hopefully this will continue to improve over the next 24 hours.  - She does have significant conduction system disease on her EKG.  The possibility of permanent PPM was discussed with the family  - Zoll pads to anterior chest wall with back up pacing device at bedside  - Atropine at bedside  - No indication for dopamine or TVP for now  - Needs continuous cardiac monitoring    CAD s/p CAbg w/ AVR  - CE negative x1 continue  serial enzymes and EKGs  - Continue aspirin  - Continue statin drug    PAfib   - Currently not on any AC would cover on full dose lovenox since eliquis is being held  - Propafenone not the best choice of anti arrhythmic drug in a patient with advanced age with structural heart disease. To be discontinued permanently    HFpEF  - CW Lasix  - Check 2D echo    -Monitor and replete lytes, keep K>4 and Mg >2  - Further cardiac workup will depend on clinical course.   - All other workup per primary team  Thank you for the consult  Will continue to follow  Marques Batista [facility]  Cardiology   Spectra #[phone]

## 2019-08-12 NOTE — PROGRESS NOTE ADULT - PROBLEM SELECTOR PLAN 7
IMPROVE VTE Individual Risk Assessment          RISK                                                          Points  [  ] Previous VTE                                                3  [  ] Thrombophilia                                             2  [  ] Lower limb paralysis                                   2        (unable to hold up >15 seconds)    [  ] Current Cancer                                             2         (within 6 months)  [  ] Immobilization > 24 hrs                              1  [  ] ICU/CCU stay > 24 hours                             1  [ x ] Age > 60                                                         1    IMPROVE VTE Score: 1    Patient anticoagulated with eliquis at home but will start lovenox 60 subQ daily as patient may need procedure for PPM. - Continue home simvastatin 20

## 2019-08-12 NOTE — PROGRESS NOTE ADULT - PROBLEM SELECTOR PLAN 6
- Continue home simvastatin 20 - Hold home eliquis and start with lovenox as patient may need to undergo procedure for PPM

## 2019-08-12 NOTE — PROGRESS NOTE ADULT - SUBJECTIVE AND OBJECTIVE BOX
[facility] -- Prisca London, Leti, Lottie, Jie, Tarun Nicholson  Office # [phone]      Follow Up:      Subjective/Observations:   No events overnight resting comfortably in bed.  No complaints of chest pain, dyspnea, or palpitations reported. No signs of orthopnea or PND.     REVIEW OF SYSTEMS: All other review of systems is negative unless indicated above    PAST MEDICAL & SURGICAL HISTORY:  Atrial fibrillation  CHF (congestive heart failure)  HTN (hypertension)  High cholesterol  H/O aortic valve repair  S/P mitral valve repair  S/P CABG (coronary artery bypass graft)      MEDICATIONS  (STANDING):  aspirin enteric coated 81 milliGRAM(s) Oral daily  furosemide    Tablet 40 milliGRAM(s) Oral daily  simvastatin 20 milliGRAM(s) Oral at bedtime    MEDICATIONS  (PRN):      Allergies    No Known Allergies    Intolerances        Vital Signs Last 24 Hrs  T(C): 36.9 (12 Aug 2019 07:41), Max: 37.3 (11 Aug 2019 20:31)  T(F): 98.4 (12 Aug 2019 07:41), Max: 99.1 (11 Aug 2019 20:31)  HR: 55 (12 Aug 2019 07:41) (38 - 61)  BP: 92/55 (12 Aug 2019 07:41) (92/55 - 130/58)  BP(mean): --  RR: 18 (12 Aug 2019 07:41) (16 - 18)  SpO2: 98% (12 Aug 2019 07:41) (93% - 100%)    I&O's Summary    Weight (kg): 61.2 (08-11 @ 10:11)    PHYSICAL EXAM:  TELE:   Constitutional: NAD, awake and alert, well-developed  HEENT: Moist Mucous Membranes, Anicteric  Pulmonary: Non-labored, breath sounds are clear bilaterally, No wheezing, crackles or rhonchi  Cardiovascular: Regular, S1 and S2 nl, No murmurs, rubs, gallops or clicks  Gastrointestinal: Bowel Sounds present, soft, nontender.   Lymph: No lymphadenopathy. No peripheral edema.  Skin: No visible rashes or ulcers.  Psych:  Mood & affect appropriate    LABS: All Labs Reviewed:                        10.9   4.27  )-----------( 152      ( 11 Aug 2019 11:45 )             34.0     11 Aug 2019 11:45    144    |  111    |  31     ----------------------------<  131    3.8     |  25     |  1.40     Ca    7.4        11 Aug 2019 11:45  Mg     2.3       11 Aug 2019 11:45    TPro  7.1    /  Alb  2.7    /  TBili  0.6    /  DBili  x      /  AST  32     /  ALT  18     /  AlkPhos  95     11 Aug 2019 11:45    PT/INR - ( 11 Aug 2019 11:45 )   PT: 19.5 sec;   INR: 1.68 ratio         PTT - ( 11 Aug 2019 11:45 )  PTT:37.8 sec  CARDIAC MARKERS ( 11 Aug 2019 11:45 )  <.015 ng/mL / x     / 56 U/L / x     / <1.0 ng/mL         ECG:  Junctional bradycardia with retrograde p waves.  RBBB.  LPFB.    Echo:    Radiology:  < from: Xray Chest 1 View-PORTABLE IMMEDIATE (08.11.19 @ 10:40) >  EXAM:  XR CHEST PORTABLE IMMED 1V                            PROCEDURE DATE:  08/11/2019          INTERPRETATION:  CLINICAL INFORMATION: Chest pain.    TECHNIQUE: Frontal view of the chest   COMPARISON: None.    FINDINGS:    LUNGS/PLEURA: Small left pleural effusion with basilar atelectasis or   consolidation. No pneumothorax.  MEDIASTINUM: Cardiac silhouette is enlarged. Mitral valve replacement and   cardiac valve repair.  OTHER: Sternotomy.    IMPRESSION:     Small left pleural effusion withbasilar atelectasis or consolidation.                 ELIZABETH WHITE M.D., ATTENDING RADIOLOGIST  This document has been electronically signed. Aug 11 2019 11:02AM    < end of copied text >           Marques Batista ANP   Cardiology [facility] -- Prisca London, Leti, Lottie, Jie, Tarun Nicholson  Office # [phone]      Follow Up:      Subjective/Observations:   No events overnight resting comfortably in bed.  No complaints of chest pain, dyspnea, or palpitations reported. No signs of orthopnea or PND.     REVIEW OF SYSTEMS: All other review of systems is negative unless indicated above    PAST MEDICAL & SURGICAL HISTORY:  Atrial fibrillation  CHF (congestive heart failure)  HTN (hypertension)  High cholesterol  H/O aortic valve repair  S/P mitral valve repair  S/P CABG (coronary artery bypass graft)      MEDICATIONS  (STANDING):  aspirin enteric coated 81 milliGRAM(s) Oral daily  furosemide    Tablet 40 milliGRAM(s) Oral daily  simvastatin 20 milliGRAM(s) Oral at bedtime    MEDICATIONS  (PRN):      Allergies    No Known Allergies    Intolerances        Vital Signs Last 24 Hrs  T(C): 36.9 (12 Aug 2019 07:41), Max: 37.3 (11 Aug 2019 20:31)  T(F): 98.4 (12 Aug 2019 07:41), Max: 99.1 (11 Aug 2019 20:31)  HR: 55 (12 Aug 2019 07:41) (38 - 61)  BP: 92/55 (12 Aug 2019 07:41) (92/55 - 130/58)  BP(mean): --  RR: 18 (12 Aug 2019 07:41) (16 - 18)  SpO2: 98% (12 Aug 2019 07:41) (93% - 100%)    I&O's Summary    Weight (kg): 61.2 (08-11 @ 10:11)    PHYSICAL EXAM:  TELE: SR/ junctional  Constitutional: NAD, awake and alert, well-developed  HEENT: Moist Mucous Membranes, Anicteric  Pulmonary: Non-labored, breath sounds with crackles at L bases , No wheezing, or rhonchi   Cardiovascular: Regular, S1 and S2 nl, + murmur No rubs, gallops or clicks   Gastrointestinal: Bowel Sounds present, soft, nontender.   Lymph: No lymphadenopathy. No peripheral edema.  Skin: No visible rashes or ulcers.  Psych:  Mood & affect appropriate    LABS: All Labs Reviewed:                        10.9   4.27  )-----------( 152      ( 11 Aug 2019 11:45 )             34.0     11 Aug 2019 11:45    144    |  111    |  31     ----------------------------<  131    3.8     |  25     |  1.40     Ca    7.4        11 Aug 2019 11:45  Mg     2.3       11 Aug 2019 11:45    TPro  7.1    /  Alb  2.7    /  TBili  0.6    /  DBili  x      /  AST  32     /  ALT  18     /  AlkPhos  95     11 Aug 2019 11:45    PT/INR - ( 11 Aug 2019 11:45 )   PT: 19.5 sec;   INR: 1.68 ratio         PTT - ( 11 Aug 2019 11:45 )  PTT:37.8 sec  CARDIAC MARKERS ( 11 Aug 2019 11:45 )  <.015 ng/mL / x     / 56 U/L / x     / <1.0 ng/mL         ECG:  Junctional bradycardia with retrograde p waves.  RBBB.  LPFB.    Echo:    Radiology:  < from: Xray Chest 1 View-PORTABLE IMMEDIATE (08.11.19 @ 10:40) >  EXAM:  XR CHEST PORTABLE IMMED 1V                            PROCEDURE DATE:  08/11/2019          INTERPRETATION:  CLINICAL INFORMATION: Chest pain.    TECHNIQUE: Frontal view of the chest   COMPARISON: None.    FINDINGS:    LUNGS/PLEURA: Small left pleural effusion with basilar atelectasis or   consolidation. No pneumothorax.  MEDIASTINUM: Cardiac silhouette is enlarged. Mitral valve replacement and   cardiac valve repair.  OTHER: Sternotomy.    IMPRESSION:     Small left pleural effusion withbasilar atelectasis or consolidation.                 ELIZABETH WHITE M.D., ATTENDING RADIOLOGIST  This document has been electronically signed. Aug 11 2019 11:02AM    < end of copied text >           Marques Batista ANP   Cardiology

## 2019-08-12 NOTE — PROGRESS NOTE ADULT - ASSESSMENT
85 year old Chinese-speaking female with a past medical history of CHF (last echo 2 months ago normal per family), MI in s/p mitral valve replacement, aortic valve repair in 2015, atrial fibrillation on eliquis,  HLD, and HTN admitted with pre-syncopal episode and symptomatic bradycardia with hypotension likely 2/2 to medications.

## 2019-08-12 NOTE — PROGRESS NOTE ADULT - PROBLEM SELECTOR PROBLEM 7
Need for prophylactic measure High cholesterol Topical Clindamycin Counseling: Patient counseled that this medication may cause skin irritation or allergic reactions.  In the event of skin irritation, the patient was advised to reduce the amount of the drug applied or use it less frequently.   The patient verbalized understanding of the proper use and possible adverse effects of clindamycin.  All of the patient's questions and concerns were addressed.

## 2019-08-12 NOTE — PROGRESS NOTE ADULT - PROBLEM SELECTOR PLAN 1
- Bradycardia likely 2/2 beta blocker and antiarrhythmic  - S/p 1/2 amp atropine w/ rise in HR from mids 30s to 40s-50s. Will hold off on atropine for now.  - Hold home metoprolol and propafenone.  - Glucagon 1mg IV once for beta blocker reversal  - Continuous monitoring on telemetry   - Echo ordered, F/U  - Zoll pads to anterior chest wall with back up pacing device at bedside  - No indication for dopamine or TVP for now  - May need PPM  - Cardiology consulted (Dr. Ceron) - Bradycardia likely 2/2 beta blocker and antiarrhythmic  - S/p 1/2 amp atropine w/ rise in HR from mids 30s to 40s-50s. Will hold off on atropine for now.  - Hold home metoprolol and propafenone.  - S/p Glucagon 1mg IV once for beta blocker reversal  - Continuous monitoring on telemetry   - Echo ordered, F/U  - Zoll pads to anterior chest wall with back up pacing device at bedside  - No indication for dopamine or TVP for now  - May need PPM  - Cardiology consulted (Dr. Ceron) - Bradycardia likely 2/2 beta blocker and antiarrhythmic  - S/p 1/2 amp atropine w/ rise in HR from mids 30s to 40s-50s. Will hold off on atropine for now.  - Hold home metoprolol and propafenone  - S/p Glucagon 1mg IV once for beta blocker reversal  - Continuous monitoring on telemetry   - Echo ordered, F/U  - Zoll pads to anterior chest wall with back up pacing device at bedside  - No indication for dopamine or TVP for now  - May need PPM  - Cardiology consulted (Dr. Ceron)

## 2019-08-12 NOTE — PROGRESS NOTE ADULT - PROBLEM SELECTOR PLAN 3
- Continue home ASA 81 and furosemide 40 oral daily with hold parameters - Likely 2/2 to bradycardia or infection  - WBC 4.27 on admission, now 11.83  - Patient not exhibiting any signs or symptoms of active infection at this time  - CXR showed small left pleural effusion with bibasilar atelectasis or consolidation.  - Will repeat CXR tomorrow - Likely 2/2 to bradycardia or infection  - WBC 4.27 on admission, now 11.83  - Patient not exhibiting any signs or symptoms of active infection at this time  - CXR showed small left pleural effusion with bibasilar atelectasis or consolidation.  - Will repeat CXR tomorrow  - May need pulm eval (family requested that we consider pulm eval)

## 2019-08-12 NOTE — PROGRESS NOTE ADULT - PROBLEM SELECTOR PLAN 5
- Hold home eliquis and start with lovenox as patient may need to undergo procedure for PPM - Holding home metoprolol

## 2019-08-12 NOTE — PROGRESS NOTE ADULT - SUBJECTIVE AND OBJECTIVE BOX
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 She was at her functional baseline early this morning, and then was subsequently found by family to be lying on the couch essentially unresponsive with pallor, cyanosis of her lips and foaming at the mouth. EMS was called. After several minutes, family was able to arouse her, and patient reported feeling dizzy and nauseous. As per chart review, EMS found patient  weak appearing, hypotensive and bradycardic, and patient was given 1/2 amp of atropine IV and heart rate improved from mid 30s to mid 40s-50s with subsequent rise in BP. Patient reports that she is still mildy dizzy, weak, and fatigued, but feeling better. She is unsure if she lost consciousness during the episode. She denies chest pain, shortness of breath, diaphoresis, fever, chills, nausea, abdominal pain, recent fall, recent change in medications.    ED vitals: T: 97.9, HR: 46 BP: 108/55 RR: 18 O2 saturation: initially 99 on nonrebreather then 100 on NC 2 L. In the ED patient was given: IVF bolus X2, glucagon 1mg IV. EKG: Wide QRS, RBBB, left posterior fascicle block, bifascicular block. One episode of vomiting with chest pain following glucagon administration. EKG unchanged. Labs notable for WBC 4.27, H/h 10.9/34, BUN/Cr 31/1.4 (baseline unknown), initial troponin <.015. CXR: Small left pleural effusion with basilar atelectasis or consolidation.     Patient was evaluated by cardio consult (Dr. Ceron) in the ED. (11 Aug 2019 11:33)    ----  INTERVAL HPI/OVERNIGHT EVENTS: Pt seen and evaluated at the bedside. Patient remained bradycardic overnight with HR 40s-50a, with some junctional rhythms but was asymptomatic. Patient's family at bedside and translated. Family reports patient is much closer to baseline today and has an appetite for the first time since yesterday. Additionally, family states patient had started taking half her metoprolol dose because she didn't like the way it made her feel but recently restarted her full dose after her cardiologist told her to. Patient had a BM last night. Patient denies any dizziness, chest pain, SOB, fever, chills, nausea at this time.     ----  PAST MEDICAL & SURGICAL HISTORY:  Atrial fibrillation  CHF (congestive heart failure)  HTN (hypertension)  High cholesterol  Arrhythmia  Mitral regurgitation  Pleural effusion  H/O aortic valve repair  S/P mitral valve repair  S/P CABG (coronary artery bypass graft)  No significant past surgical history    FAMILY HISTORY:  No significant family history    Allergies  No Known Allergies    Intolerances    ----  REVIEW OF SYSTEMS:  CONSTITUTIONAL: denies fever, chills, fatigue, weakness  HEENT: denies blurred vision, sore throat  SKIN: denies new lesions, rash  CARDIOVASCULAR: denies chest pain, chest pressure, palpitations  RESPIRATORY: denies shortness of breath, sputum production  GASTROINTESTINAL: denies nausea, vomiting, diarrhea, abdominal pain  GENITOURINARY: denies dysuria, discharge  NEUROLOGICAL: denies numbness, headache, focal weakness  MUSCULOSKELETAL: denies new joint pain, muscle aches  HEMATOLOGIC: denies gross bleeding, bruising  LYMPHATICS: denies enlarged lymph nodes, extremity swelling  PSYCHIATRIC: denies recent changes in anxiety, depression  ENDOCRINOLOGIC: denies sweating, cold or heat intolerance    ----  PHYSICAL EXAM:  GENERAL: patient appears well, no acute distress, appropriately interactive  EYES: sclera clear, no exudates  ENMT: oropharynx clear without erythema, moist mucous membranes  NECK: supple, soft, no thyromegaly noted  LUNGS: good air entry bilaterally, clear to auscultation, symmetric breath sounds, no wheezing or rhonchi appreciated  HEART: soft S1/S2, regular rate and rhythm, no murmurs noted, no noted edema to b/l LE  GASTROINTESTINAL: abdomen is soft, nontender, nondistended, normoactive bowel sounds, no palpable masses  INTEGUMENT: good skin turgor, appropriate for ethnicity, appears well perfused, no jaundice noted  MUSCULOSKELETAL: no clubbing or cyanosis, no obvious deformity  NEUROLOGIC: awake, alert, oriented x3, good muscle tone in 4 extremities, no obvious sensory deficits  PSYCHIATRIC: mood is good, affect is congruent with mood, linear and logical thought process  HEME/LYMPH: no palpable supraclavicular nodules, no obvious ecchymosis     T(C): 36.8 (08-12-19 @ 11:41), Max: 37.3 (08-11-19 @ 20:31)  HR: 47 (08-12-19 @ 11:41) (38 - 61)  BP: 108/65 (08-12-19 @ 11:41) (92/55 - 130/58)  RR: 18 (08-12-19 @ 11:41) (16 - 18)  SpO2: 100% (08-12-19 @ 11:41) (93% - 100%)  Wt(kg): --    ----  I&O's Summary      LABS:                        10.4   11.83 )-----------( 137      ( 12 Aug 2019 08:21 )             32.2     08-12    142  |  110<H>  |  43<H>  ----------------------------<  104<H>  4.6   |  25  |  1.40<H>    Ca    8.1<L>      12 Aug 2019 08:21  Mg     2.3     08-11    TPro  7.1  /  Alb  2.7<L>  /  TBili  0.6  /  DBili  x   /  AST  32  /  ALT  18  /  AlkPhos  95  08-11    PT/INR - ( 11 Aug 2019 11:45 )   PT: 19.5 sec;   INR: 1.68 ratio         PTT - ( 11 Aug 2019 11:45 )  PTT:37.8 sec    CAPILLARY BLOOD GLUCOSE    ----  Personally reviewed:  Vital sign trends: [  ] yes    [  ] no     [  ] n/a  Laboratory results: [  ] yes    [  ] no     [  ] n/a  Radiology results: [  ] yes    [  ] no     [  ] n/a  Culture results: [  ] yes    [  ] no     [  ] n/a  Consultant recommendations: [  ] yes    [  ] no     [  ] n/a Patient is a 85y old  Female who presents with a chief complaint of Bradycardia (12 Aug 2019 08:20)    FROM ADMISSION H+P:   HPI:  85 year old Chinese-speaking female with a past medical history of CHF (last echo 2 months ago normal per family), MI in s/p mitral valve replacement, aortic valve repair in 2015, atrial fibrillation on eliquis,  HLD, and HTN, BIBEMS s/p developing weakness and fatigue at 9:30AM. She was at her functional baseline early this morning, and then was subsequently found by family to be lying on the couch essentially unresponsive with pallor, cyanosis of her lips and foaming at the mouth. EMS was called. After several minutes, family was able to arouse her, and patient reported feeling dizzy and nauseous. As per chart review, EMS found patient  weak appearing, hypotensive and bradycardic, and patient was given 1/2 amp of atropine IV and heart rate improved from mid 30s to mid 40s-50s with subsequent rise in BP. Patient reports that she is still mildy dizzy, weak, and fatigued, but feeling better. She is unsure if she lost consciousness during the episode. She denies chest pain, shortness of breath, diaphoresis, fever, chills, nausea, abdominal pain, recent fall, recent change in medications.    ED vitals: T: 97.9, HR: 46 BP: 108/55 RR: 18 O2 saturation: initially 99 on nonrebreather then 100 on NC 2 L. In the ED patient was given: IVF bolus X2, glucagon 1mg IV. EKG: Wide QRS, RBBB, left posterior fascicle block, bifascicular block. One episode of vomiting with chest pain following glucagon administration. EKG unchanged. Labs notable for WBC 4.27, H/h 10.9/34, BUN/Cr 31/1.4 (baseline unknown), initial troponin <.015. CXR: Small left pleural effusion with basilar atelectasis or consolidation.     Patient was evaluated by cardio consult (Dr. Ceron) in the ED. (11 Aug 2019 11:33)    ----  INTERVAL HPI/OVERNIGHT EVENTS: Pt seen and evaluated at the bedside. Patient remained bradycardic overnight with HR 40s-50a, with some junctional rhythms but was asymptomatic. Patient's family at bedside and translated. Family reports patient is much closer to baseline today and has an appetite for the first time since yesterday. Additionally, family states patient had started taking half her metoprolol dose because she didn't like the way it made her feel but recently restarted her full dose after her cardiologist told her to. Patient had a BM last night. Patient reports she is coughing up "pinkish-brownish" phlegm. Patient denies any dizziness, chest pain, SOB, fever, chills, nausea at this time.     ----  PAST MEDICAL & SURGICAL HISTORY:  Atrial fibrillation  CHF (congestive heart failure)  HTN (hypertension)  High cholesterol  Arrhythmia  Mitral regurgitation  Pleural effusion  H/O aortic valve repair  S/P mitral valve repair  S/P CABG (coronary artery bypass graft)  No significant past surgical history    FAMILY HISTORY:  No significant family history    Allergies  No Known Allergies    Intolerances    ----  REVIEW OF SYSTEMS:  CONSTITUTIONAL: denies fever, chills, fatigue, weakness  HEENT: denies blurred vision, sore throat  SKIN: denies new lesions, rash  CARDIOVASCULAR: denies chest pain, chest pressure, palpitations  RESPIRATORY: denies shortness of breath, sputum production  GASTROINTESTINAL: denies nausea, vomiting, diarrhea, abdominal pain  GENITOURINARY: denies dysuria, discharge  NEUROLOGICAL: denies numbness, headache, focal weakness  MUSCULOSKELETAL: denies new joint pain, muscle aches  HEMATOLOGIC: denies gross bleeding, bruising  LYMPHATICS: denies enlarged lymph nodes, extremity swelling  PSYCHIATRIC: denies recent changes in anxiety, depression  ENDOCRINOLOGIC: denies sweating, cold or heat intolerance    ----  PHYSICAL EXAM:  GENERAL: patient appears well, no acute distress, appropriately interactive  EYES: sclera clear, no exudates  ENMT: oropharynx clear without erythema, moist mucous membranes  NECK: supple, soft, no thyromegaly noted  LUNGS: good air entry bilaterally, clear to auscultation, symmetric breath sounds, no wheezing or rhonchi appreciated  HEART: soft S1/S2, regular rate and rhythm, no murmurs noted, no noted edema to b/l LE  GASTROINTESTINAL: abdomen is soft, nontender, nondistended, normoactive bowel sounds, no palpable masses  INTEGUMENT: good skin turgor, appropriate for ethnicity, appears well perfused, no jaundice noted  MUSCULOSKELETAL: no clubbing or cyanosis, no obvious deformity  NEUROLOGIC: awake, alert, oriented x3, good muscle tone in 4 extremities, no obvious sensory deficits  PSYCHIATRIC: mood is good, affect is congruent with mood, linear and logical thought process  HEME/LYMPH: no palpable supraclavicular nodules, no obvious ecchymosis     T(C): 36.8 (08-12-19 @ 11:41), Max: 37.3 (08-11-19 @ 20:31)  HR: 47 (08-12-19 @ 11:41) (38 - 61)  BP: 108/65 (08-12-19 @ 11:41) (92/55 - 130/58)  RR: 18 (08-12-19 @ 11:41) (16 - 18)  SpO2: 100% (08-12-19 @ 11:41) (93% - 100%)  Wt(kg): --    ----  I&O's Summary      LABS:                        10.4   11.83 )-----------( 137      ( 12 Aug 2019 08:21 )             32.2     08-12    142  |  110<H>  |  43<H>  ----------------------------<  104<H>  4.6   |  25  |  1.40<H>    Ca    8.1<L>      12 Aug 2019 08:21  Mg     2.3     08-11    TPro  7.1  /  Alb  2.7<L>  /  TBili  0.6  /  DBili  x   /  AST  32  /  ALT  18  /  AlkPhos  95  08-11    PT/INR - ( 11 Aug 2019 11:45 )   PT: 19.5 sec;   INR: 1.68 ratio         PTT - ( 11 Aug 2019 11:45 )  PTT:37.8 sec    CAPILLARY BLOOD GLUCOSE    ----  Personally reviewed:  Vital sign trends: [  ] yes    [  ] no     [  ] n/a  Laboratory results: [  ] yes    [  ] no     [  ] n/a  Radiology results: [  ] yes    [  ] no     [  ] n/a  Culture results: [  ] yes    [  ] no     [  ] n/a  Consultant recommendations: [  ] yes    [  ] no     [  ] n/a Patient is a 85y old  Female who presents with a chief complaint of Bradycardia (12 Aug 2019 08:20)    FROM ADMISSION H+P:   HPI:  85 year old Chinese-speaking female with a past medical history of CHF (last echo 2 months ago normal per family), MI in s/p mitral valve replacement, aortic valve repair in 2015, atrial fibrillation on eliquis,  HLD, and HTN, BIBEMS s/p developing weakness and fatigue at 9:30AM. She was at her functional baseline early this morning, and then was subsequently found by family to be lying on the couch essentially unresponsive with pallor, cyanosis of her lips and foaming at the mouth. EMS was called. After several minutes, family was able to arouse her, and patient reported feeling dizzy and nauseous. As per chart review, EMS found patient  weak appearing, hypotensive and bradycardic, and patient was given 1/2 amp of atropine IV and heart rate improved from mid 30s to mid 40s-50s with subsequent rise in BP. Patient reports that she is still mildy dizzy, weak, and fatigued, but feeling better. She is unsure if she lost consciousness during the episode. She denies chest pain, shortness of breath, diaphoresis, fever, chills, nausea, abdominal pain, recent fall, recent change in medications.    ED vitals: T: 97.9, HR: 46 BP: 108/55 RR: 18 O2 saturation: initially 99 on nonrebreather then 100 on NC 2 L. In the ED patient was given: IVF bolus X2, glucagon 1mg IV. EKG: Wide QRS, RBBB, left posterior fascicle block, bifascicular block. One episode of vomiting with chest pain following glucagon administration. EKG unchanged. Labs notable for WBC 4.27, H/h 10.9/34, BUN/Cr 31/1.4 (baseline unknown), initial troponin <.015. CXR: Small left pleural effusion with basilar atelectasis or consolidation.     Patient was evaluated by cardio consult (Dr. Ceron) in the ED. (11 Aug 2019 11:33)    ----  INTERVAL HPI/OVERNIGHT EVENTS: Pt seen and evaluated at the bedside. Patient remained bradycardic overnight with HR 40s-50a, with some junctional rhythms but was asymptomatic. Patient's family at bedside and translated. Family reports patient is much closer to baseline today and has an appetite for the first time since yesterday. Additionally, family states patient had started taking half her metoprolol dose because she didn't like the way it made her feel but recently restarted her full dose after her cardiologist told her to. Patient had a BM last night. Patient reports she is coughing up "pinkish-brownish" phlegm. Patient denies any dizziness, chest pain, SOB, fever, chills, nausea at this time.     ----  PAST MEDICAL & SURGICAL HISTORY:  Atrial fibrillation  CHF (congestive heart failure)  HTN (hypertension)  High cholesterol  Arrhythmia  Mitral regurgitation  Pleural effusion  H/O aortic valve repair  S/P mitral valve repair  S/P CABG (coronary artery bypass graft)  No significant past surgical history    FAMILY HISTORY:  No significant family history    Allergies  No Known Allergies    Intolerances    ----  REVIEW OF SYSTEMS:  CONSTITUTIONAL: denies fever, chills, fatigue, weakness  HEENT: denies blurred vision, sore throat  SKIN: denies new lesions, rash  CARDIOVASCULAR: denies chest pain, chest pressure, palpitations  RESPIRATORY: denies shortness of breath, sputum production  GASTROINTESTINAL: denies nausea, vomiting, diarrhea, abdominal pain  GENITOURINARY: denies dysuria, discharge  NEUROLOGICAL: denies numbness, headache, focal weakness  MUSCULOSKELETAL: denies new joint pain, muscle aches  HEMATOLOGIC: denies gross bleeding, bruising    ----  PHYSICAL EXAM:  GENERAL: patient appears well, no acute distress, appropriately interactive  EYES: sclera clear, no exudates  ENMT: moist mucous membranes  NECK: supple, soft  LUNGS: + rhonchi in upper lungs B/L, no wheezing  HEART: soft S1/S2, regular rate and rhythm, no murmurs noted, no noted edema to b/l LE  GASTROINTESTINAL: abdomen is soft, nontender, nondistended, normoactive bowel sounds, no palpable masses  INTEGUMENT: good skin turgor, appropriate for ethnicity, appears well perfused, no jaundice noted  MUSCULOSKELETAL: no clubbing or cyanosis, no obvious deformity  NEUROLOGIC: awake, alert, oriented x3, good muscle tone in 4 extremities, no obvious sensory deficits    T(C): 36.8 (08-12-19 @ 11:41), Max: 37.3 (08-11-19 @ 20:31)  HR: 47 (08-12-19 @ 11:41) (38 - 61)  BP: 108/65 (08-12-19 @ 11:41) (92/55 - 130/58)  RR: 18 (08-12-19 @ 11:41) (16 - 18)  SpO2: 100% (08-12-19 @ 11:41) (93% - 100%)  Wt(kg): --    ----  I&O's Summary      LABS:                        10.4   11.83 )-----------( 137      ( 12 Aug 2019 08:21 )             32.2     08-12    142  |  110<H>  |  43<H>  ----------------------------<  104<H>  4.6   |  25  |  1.40<H>    Ca    8.1<L>      12 Aug 2019 08:21  Mg     2.3     08-11    TPro  7.1  /  Alb  2.7<L>  /  TBili  0.6  /  DBili  x   /  AST  32  /  ALT  18  /  AlkPhos  95  08-11    PT/INR - ( 11 Aug 2019 11:45 )   PT: 19.5 sec;   INR: 1.68 ratio         PTT - ( 11 Aug 2019 11:45 )  PTT:37.8 sec    CAPILLARY BLOOD GLUCOSE    ----  Personally reviewed:  Vital sign trends: [  ] yes    [  ] no     [  ] n/a  Laboratory results: [  ] yes    [  ] no     [  ] n/a  Radiology results: [  ] yes    [  ] no     [  ] n/a  Culture results: [  ] yes    [  ] no     [  ] n/a  Consultant recommendations: [  ] yes    [  ] no     [  ] n/a Patient is a 85y old  Female who presents with a chief complaint of Bradycardia (12 Aug 2019 08:20)    FROM ADMISSION H+P:   HPI:  85 year old Chinese-speaking female with a past medical history of CHF (last echo 2 months ago normal per family), MI in s/p mitral valve replacement, aortic valve repair in 2015, atrial fibrillation on eliquis,  HLD, and HTN, BIBEMS s/p developing weakness and fatigue at 9:30AM. She was at her functional baseline early this morning, and then was subsequently found by family to be lying on the couch essentially unresponsive with pallor, cyanosis of her lips and foaming at the mouth. EMS was called. After several minutes, family was able to arouse her, and patient reported feeling dizzy and nauseous. As per chart review, EMS found patient  weak appearing, hypotensive and bradycardic, and patient was given 1/2 amp of atropine IV and heart rate improved from mid 30s to mid 40s-50s with subsequent rise in BP. Patient reports that she is still mildy dizzy, weak, and fatigued, but feeling better. She is unsure if she lost consciousness during the episode. She denies chest pain, shortness of breath, diaphoresis, fever, chills, nausea, abdominal pain, recent fall, recent change in medications.    ED vitals: T: 97.9, HR: 46 BP: 108/55 RR: 18 O2 saturation: initially 99 on nonrebreather then 100 on NC 2 L. In the ED patient was given: IVF bolus X2, glucagon 1mg IV. EKG: Wide QRS, RBBB, left posterior fascicle block, bifascicular block. One episode of vomiting with chest pain following glucagon administration. EKG unchanged. Labs notable for WBC 4.27, H/h 10.9/34, BUN/Cr 31/1.4 (baseline unknown), initial troponin <.015. CXR: Small left pleural effusion with basilar atelectasis or consolidation.     Patient was evaluated by cardio consult (Dr. Ceron) in the ED. (11 Aug 2019 11:33)    ----  INTERVAL HPI/OVERNIGHT EVENTS: Pt seen and evaluated at the bedside. Patient remained bradycardic overnight with HR 40s-50s, with some junctional rhythms but was asymptomatic. Patient's family at bedside and translated. Family reports patient is much closer to baseline today and has an appetite for the first time since yesterday. Additionally, family states patient had started taking half her metoprolol dose because she didn't like the way it made her feel but recently restarted her full dose after her cardiologist told her to. Patient had a BM last night. Patient reports she is coughing up "pinkish-brownish" phlegm. Patient denies any dizziness, chest pain, SOB, fever, chills, nausea at this time.     ----  PAST MEDICAL & SURGICAL HISTORY:  Atrial fibrillation  CHF (congestive heart failure)  HTN (hypertension)  High cholesterol  Arrhythmia  Mitral regurgitation  Pleural effusion  H/O aortic valve repair  S/P mitral valve repair  S/P CABG (coronary artery bypass graft)  No significant past surgical history    FAMILY HISTORY:  No significant family history    Allergies  No Known Allergies    Intolerances    ----  REVIEW OF SYSTEMS:  CONSTITUTIONAL: denies fever, chills, fatigue, weakness  HEENT: denies blurred vision, sore throat  SKIN: denies new lesions, rash  CARDIOVASCULAR: denies chest pain, chest pressure, palpitations  RESPIRATORY: denies shortness of breath, sputum production  GASTROINTESTINAL: denies nausea, vomiting, diarrhea, abdominal pain  GENITOURINARY: denies dysuria, discharge  NEUROLOGICAL: denies numbness, headache, focal weakness  MUSCULOSKELETAL: denies new joint pain, muscle aches  HEMATOLOGIC: denies gross bleeding, bruising    ----  PHYSICAL EXAM:  GENERAL: patient appears well, no acute distress, appropriately interactive  EYES: sclera clear, no exudates  ENMT: moist mucous membranes  NECK: supple, soft  LUNGS: + rhonchi in upper lungs B/L, no wheezing  HEART: soft S1/S2, regular rate and rhythm, no murmurs noted, no noted edema to b/l LE  GASTROINTESTINAL: abdomen is soft, nontender, nondistended, normoactive bowel sounds, no palpable masses  INTEGUMENT: good skin turgor, appropriate for ethnicity, appears well perfused, no jaundice noted  MUSCULOSKELETAL: no clubbing or cyanosis, no obvious deformity  NEUROLOGIC: awake, alert, oriented x3, good muscle tone in 4 extremities, no obvious sensory deficits    T(C): 36.8 (08-12-19 @ 11:41), Max: 37.3 (08-11-19 @ 20:31)  HR: 47 (08-12-19 @ 11:41) (38 - 61)  BP: 108/65 (08-12-19 @ 11:41) (92/55 - 130/58)  RR: 18 (08-12-19 @ 11:41) (16 - 18)  SpO2: 100% (08-12-19 @ 11:41) (93% - 100%)  Wt(kg): --    ----  I&O's Summary      LABS:                        10.4   11.83 )-----------( 137      ( 12 Aug 2019 08:21 )             32.2     08-12    142  |  110<H>  |  43<H>  ----------------------------<  104<H>  4.6   |  25  |  1.40<H>    Ca    8.1<L>      12 Aug 2019 08:21  Mg     2.3     08-11    TPro  7.1  /  Alb  2.7<L>  /  TBili  0.6  /  DBili  x   /  AST  32  /  ALT  18  /  AlkPhos  95  08-11    PT/INR - ( 11 Aug 2019 11:45 )   PT: 19.5 sec;   INR: 1.68 ratio         PTT - ( 11 Aug 2019 11:45 )  PTT:37.8 sec    CAPILLARY BLOOD GLUCOSE    ----  Personally reviewed:  Vital sign trends: [  ] yes    [  ] no     [  ] n/a  Laboratory results: [  ] yes    [  ] no     [  ] n/a  Radiology results: [  ] yes    [  ] no     [  ] n/a  Culture results: [  ] yes    [  ] no     [  ] n/a  Consultant recommendations: [  ] yes    [  ] no     [  ] n/a Patient is a 85y old  Female who presents with a chief complaint of Bradycardia (12 Aug 2019 08:20)    FROM ADMISSION H+P:   HPI:  85 year old Chinese-speaking female with a past medical history of CHF (last echo 2 months ago normal per family), MI in s/p mitral valve replacement, aortic valve repair in 2015, atrial fibrillation on eliquis,  HLD, and HTN, BIBEMS s/p developing weakness and fatigue at 9:30AM. She was at her functional baseline early this morning, and then was subsequently found by family to be lying on the couch essentially unresponsive with pallor, cyanosis of her lips and foaming at the mouth. EMS was called. After several minutes, family was able to arouse her, and patient reported feeling dizzy and nauseous. As per chart review, EMS found patient  weak appearing, hypotensive and bradycardic, and patient was given 1/2 amp of atropine IV and heart rate improved from mid 30s to mid 40s-50s with subsequent rise in BP. Patient reports that she is still mildy dizzy, weak, and fatigued, but feeling better. She is unsure if she lost consciousness during the episode. She denies chest pain, shortness of breath, diaphoresis, fever, chills, nausea, abdominal pain, recent fall, recent change in medications.    ED vitals: T: 97.9, HR: 46 BP: 108/55 RR: 18 O2 saturation: initially 99 on nonrebreather then 100 on NC 2 L. In the ED patient was given: IVF bolus X2, glucagon 1mg IV. EKG: Wide QRS, RBBB, left posterior fascicle block, bifascicular block. One episode of vomiting with chest pain following glucagon administration. EKG unchanged. Labs notable for WBC 4.27, H/h 10.9/34, BUN/Cr 31/1.4 (baseline unknown), initial troponin <.015. CXR: Small left pleural effusion with basilar atelectasis or consolidation.     Patient was evaluated by cardio consult (Dr. Ceron) in the ED. (11 Aug 2019 11:33)    ----  INTERVAL HPI/OVERNIGHT EVENTS: Pt seen and evaluated at the bedside. Patient remained bradycardic overnight with HR 40s-50s, with some junctional rhythms but was asymptomatic. Patient's family at bedside and translated. Family reports patient is much closer to baseline today and has an appetite for the first time since yesterday. Additionally, family states patient had started taking half her metoprolol dose because she didn't like the way it made her feel but recently restarted her full dose after her cardiologist told her to. Patient had a BM last night. Patient reports she is coughing up "pinkish-brownish" phlegm. Patient denies any dizziness, chest pain, SOB, fever, chills, nausea at this time.     ----  PAST MEDICAL & SURGICAL HISTORY:  Atrial fibrillation  CHF (congestive heart failure)  HTN (hypertension)  High cholesterol  Arrhythmia  Mitral regurgitation  Pleural effusion  H/O aortic valve repair  S/P mitral valve repair  S/P CABG (coronary artery bypass graft)  No significant past surgical history    FAMILY HISTORY:  No significant family history    Allergies  No Known Allergies    Intolerances    ----  REVIEW OF SYSTEMS:  CONSTITUTIONAL: denies fever, chills, fatigue, weakness  HEENT: denies blurred vision, sore throat  SKIN: denies new lesions, rash  CARDIOVASCULAR: denies chest pain, chest pressure, palpitations  RESPIRATORY: denies shortness of breath, sputum production  GASTROINTESTINAL: denies nausea, vomiting, diarrhea, abdominal pain  GENITOURINARY: denies dysuria, discharge  NEUROLOGICAL: denies numbness, headache, focal weakness  MUSCULOSKELETAL: denies new joint pain, muscle aches  HEMATOLOGIC: denies gross bleeding, bruising    ----  PHYSICAL EXAM:  GENERAL: patient appears well, no acute distress, appropriately interactive  EYES: sclera clear, no exudates  ENMT: moist mucous membranes  NECK: supple, soft  LUNGS: presence of rhonchi in upper lungs B/L, no wheezing  HEART: soft S1/S2, regular rate and rhythm, no murmurs noted, no noted edema to b/l LE  GASTROINTESTINAL: abdomen is soft, nontender, nondistended, normoactive bowel sounds, no palpable masses  INTEGUMENT: good skin turgor, appropriate for ethnicity, appears well perfused, no jaundice noted  MUSCULOSKELETAL: no clubbing or cyanosis, no obvious deformity  NEUROLOGIC: awake, alert, oriented x3, good muscle tone in 4 extremities, no obvious sensory deficits    T(C): 36.8 (08-12-19 @ 11:41), Max: 37.3 (08-11-19 @ 20:31)  HR: 47 (08-12-19 @ 11:41) (38 - 61)  BP: 108/65 (08-12-19 @ 11:41) (92/55 - 130/58)  RR: 18 (08-12-19 @ 11:41) (16 - 18)  SpO2: 100% (08-12-19 @ 11:41) (93% - 100%)  Wt(kg): --    ----  I&O's Summary      LABS:                        10.4   11.83 )-----------( 137      ( 12 Aug 2019 08:21 )             32.2     08-12    142  |  110<H>  |  43<H>  ----------------------------<  104<H>  4.6   |  25  |  1.40<H>    Ca    8.1<L>      12 Aug 2019 08:21  Mg     2.3     08-11    TPro  7.1  /  Alb  2.7<L>  /  TBili  0.6  /  DBili  x   /  AST  32  /  ALT  18  /  AlkPhos  95  08-11    PT/INR - ( 11 Aug 2019 11:45 )   PT: 19.5 sec;   INR: 1.68 ratio         PTT - ( 11 Aug 2019 11:45 )  PTT:37.8 sec    CAPILLARY BLOOD GLUCOSE

## 2019-08-13 ENCOUNTER — TRANSCRIPTION ENCOUNTER (OUTPATIENT)
Age: 84
End: 2019-08-13

## 2019-08-13 DIAGNOSIS — J18.9 PNEUMONIA, UNSPECIFIED ORGANISM: ICD-10-CM

## 2019-08-13 LAB
ANION GAP SERPL CALC-SCNC: 6 MMOL/L — SIGNIFICANT CHANGE UP (ref 5–17)
BASOPHILS # BLD AUTO: 0.04 K/UL — SIGNIFICANT CHANGE UP (ref 0–0.2)
BASOPHILS NFR BLD AUTO: 0.5 % — SIGNIFICANT CHANGE UP (ref 0–2)
BUN SERPL-MCNC: 38 MG/DL — HIGH (ref 7–23)
CALCIUM SERPL-MCNC: 8.4 MG/DL — LOW (ref 8.5–10.1)
CHLORIDE SERPL-SCNC: 109 MMOL/L — HIGH (ref 96–108)
CO2 SERPL-SCNC: 27 MMOL/L — SIGNIFICANT CHANGE UP (ref 22–31)
CREAT SERPL-MCNC: 1.1 MG/DL — SIGNIFICANT CHANGE UP (ref 0.5–1.3)
EOSINOPHIL # BLD AUTO: 0.24 K/UL — SIGNIFICANT CHANGE UP (ref 0–0.5)
EOSINOPHIL NFR BLD AUTO: 3.3 % — SIGNIFICANT CHANGE UP (ref 0–6)
GLUCOSE SERPL-MCNC: 85 MG/DL — SIGNIFICANT CHANGE UP (ref 70–99)
HCT VFR BLD CALC: 30 % — LOW (ref 34.5–45)
HGB BLD-MCNC: 9.6 G/DL — LOW (ref 11.5–15.5)
IMM GRANULOCYTES NFR BLD AUTO: 0.8 % — SIGNIFICANT CHANGE UP (ref 0–1.5)
LYMPHOCYTES # BLD AUTO: 0.94 K/UL — LOW (ref 1–3.3)
LYMPHOCYTES # BLD AUTO: 12.8 % — LOW (ref 13–44)
MAGNESIUM SERPL-MCNC: 2.4 MG/DL — SIGNIFICANT CHANGE UP (ref 1.6–2.6)
MCHC RBC-ENTMCNC: 27.9 PG — SIGNIFICANT CHANGE UP (ref 27–34)
MCHC RBC-ENTMCNC: 32 GM/DL — SIGNIFICANT CHANGE UP (ref 32–36)
MCV RBC AUTO: 87.2 FL — SIGNIFICANT CHANGE UP (ref 80–100)
MONOCYTES # BLD AUTO: 0.97 K/UL — HIGH (ref 0–0.9)
MONOCYTES NFR BLD AUTO: 13.3 % — SIGNIFICANT CHANGE UP (ref 2–14)
NEUTROPHILS # BLD AUTO: 5.07 K/UL — SIGNIFICANT CHANGE UP (ref 1.8–7.4)
NEUTROPHILS NFR BLD AUTO: 69.3 % — SIGNIFICANT CHANGE UP (ref 43–77)
NRBC # BLD: 0 /100 WBCS — SIGNIFICANT CHANGE UP (ref 0–0)
PLATELET # BLD AUTO: 126 K/UL — LOW (ref 150–400)
POTASSIUM SERPL-MCNC: 4.3 MMOL/L — SIGNIFICANT CHANGE UP (ref 3.5–5.3)
POTASSIUM SERPL-SCNC: 4.3 MMOL/L — SIGNIFICANT CHANGE UP (ref 3.5–5.3)
RBC # BLD: 3.44 M/UL — LOW (ref 3.8–5.2)
RBC # FLD: 15.5 % — HIGH (ref 10.3–14.5)
SODIUM SERPL-SCNC: 142 MMOL/L — SIGNIFICANT CHANGE UP (ref 135–145)
WBC # BLD: 7.32 K/UL — SIGNIFICANT CHANGE UP (ref 3.8–10.5)
WBC # FLD AUTO: 7.32 K/UL — SIGNIFICANT CHANGE UP (ref 3.8–10.5)

## 2019-08-13 PROCEDURE — 93306 TTE W/DOPPLER COMPLETE: CPT | Mod: 26

## 2019-08-13 PROCEDURE — 99233 SBSQ HOSP IP/OBS HIGH 50: CPT | Mod: GC

## 2019-08-13 PROCEDURE — 71250 CT THORAX DX C-: CPT | Mod: 26

## 2019-08-13 PROCEDURE — 71045 X-RAY EXAM CHEST 1 VIEW: CPT | Mod: 26

## 2019-08-13 PROCEDURE — 99233 SBSQ HOSP IP/OBS HIGH 50: CPT

## 2019-08-13 RX ORDER — LACTOBACILLUS ACIDOPHILUS 100MM CELL
1 CAPSULE ORAL
Refills: 0 | Status: DISCONTINUED | OUTPATIENT
Start: 2019-08-13 | End: 2019-08-15

## 2019-08-13 RX ORDER — IPRATROPIUM/ALBUTEROL SULFATE 18-103MCG
3 AEROSOL WITH ADAPTER (GRAM) INHALATION
Refills: 0 | Status: DISCONTINUED | OUTPATIENT
Start: 2019-08-13 | End: 2019-08-15

## 2019-08-13 RX ORDER — CEFTRIAXONE 500 MG/1
1000 INJECTION, POWDER, FOR SOLUTION INTRAMUSCULAR; INTRAVENOUS EVERY 24 HOURS
Refills: 0 | Status: DISCONTINUED | OUTPATIENT
Start: 2019-08-14 | End: 2019-08-15

## 2019-08-13 RX ORDER — AZITHROMYCIN 500 MG/1
500 TABLET, FILM COATED ORAL ONCE
Refills: 0 | Status: COMPLETED | OUTPATIENT
Start: 2019-08-13 | End: 2019-08-13

## 2019-08-13 RX ORDER — AZITHROMYCIN 500 MG/1
500 TABLET, FILM COATED ORAL EVERY 24 HOURS
Refills: 0 | Status: DISCONTINUED | OUTPATIENT
Start: 2019-08-14 | End: 2019-08-15

## 2019-08-13 RX ORDER — CEFTRIAXONE 500 MG/1
1000 INJECTION, POWDER, FOR SOLUTION INTRAMUSCULAR; INTRAVENOUS ONCE
Refills: 0 | Status: COMPLETED | OUTPATIENT
Start: 2019-08-13 | End: 2019-08-13

## 2019-08-13 RX ORDER — CEFTRIAXONE 500 MG/1
INJECTION, POWDER, FOR SOLUTION INTRAMUSCULAR; INTRAVENOUS
Refills: 0 | Status: DISCONTINUED | OUTPATIENT
Start: 2019-08-13 | End: 2019-08-15

## 2019-08-13 RX ORDER — AZITHROMYCIN 500 MG/1
TABLET, FILM COATED ORAL
Refills: 0 | Status: DISCONTINUED | OUTPATIENT
Start: 2019-08-13 | End: 2019-08-15

## 2019-08-13 RX ADMIN — Medication 1 TABLET(S): at 18:28

## 2019-08-13 RX ADMIN — CEFTRIAXONE 100 MILLIGRAM(S): 500 INJECTION, POWDER, FOR SOLUTION INTRAMUSCULAR; INTRAVENOUS at 11:56

## 2019-08-13 RX ADMIN — Medication 1200 MILLIGRAM(S): at 18:28

## 2019-08-13 RX ADMIN — Medication 1 TABLET(S): at 13:56

## 2019-08-13 RX ADMIN — Medication 81 MILLIGRAM(S): at 11:58

## 2019-08-13 RX ADMIN — AZITHROMYCIN 255 MILLIGRAM(S): 500 TABLET, FILM COATED ORAL at 12:30

## 2019-08-13 RX ADMIN — SIMVASTATIN 20 MILLIGRAM(S): 20 TABLET, FILM COATED ORAL at 21:16

## 2019-08-13 RX ADMIN — ENOXAPARIN SODIUM 60 MILLIGRAM(S): 100 INJECTION SUBCUTANEOUS at 11:58

## 2019-08-13 NOTE — DISCHARGE NOTE PROVIDER - CARE PROVIDER_API CALL
Turner Estrada (MD)  Internal Medicine  [facility], [address] [place] [address]  Phone: [phone]  Fax: [phone]  Follow Up Time:     Linn Pineda  Cardiology  Phone: (   )    -  Fax: (   )    -  Follow Up Time:

## 2019-08-13 NOTE — SWALLOW BEDSIDE ASSESSMENT ADULT - SWALLOW EVAL: DIAGNOSIS
1- functional oral management given solid, puree, nectar thick and thin liquid textures marked by adequate bolus collection, transfer and transport. 2- mild pharyngeal dysphagia given above consistencies marked by delayed swallow trigger and reduced hyolaryngeal excursion without any overt s/s of penetration/aspiration noted.

## 2019-08-13 NOTE — SWALLOW BEDSIDE ASSESSMENT ADULT - ASR SWALLOW ASPIRATION MONITOR
fever/throat clearing/oral hygiene/pneumonia/change of breathing pattern/position upright (90Y)/gurgly voice/cough/upper respiratory infection

## 2019-08-13 NOTE — SWALLOW BEDSIDE ASSESSMENT ADULT - COMMENTS
Pt was awake and cooperative for an initial assessment of swallow function this PM. 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 " Recent chest CT revealed "Small loculated left-sided pleural effusion and associated with pleural thickening, correlate for complex pleural collection or empyema. Inferior lingula and left lower lobe airspace consolidation could reflect chronic atelectasis and/or pneumonia. Patchy groundglass opacities predominantly in the right upper lung zones as discussed which may reflect edema versus infection/pneumonia. Small to moderate right-sided pleural effusion. "

## 2019-08-13 NOTE — PROGRESS NOTE ADULT - SUBJECTIVE AND OBJECTIVE BOX
[facility] -- Prisca London, Leti, Lottie, Bharat Ceron Savella  Office # [phone]      Follow Up:      Subjective/Observations:       REVIEW OF SYSTEMS: All other review of systems is negative unless indicated above    PAST MEDICAL & SURGICAL HISTORY:  Atrial fibrillation  CHF (congestive heart failure)  HTN (hypertension)  High cholesterol  Arrhythmia  Mitral regurgitation  Pleural effusion  H/O aortic valve repair  S/P mitral valve repair  S/P CABG (coronary artery bypass graft)  No significant past surgical history      MEDICATIONS  (STANDING):  aspirin enteric coated 81 milliGRAM(s) Oral daily  enoxaparin Injectable 60 milliGRAM(s) SubCutaneous daily  furosemide    Tablet 40 milliGRAM(s) Oral daily  simvastatin 20 milliGRAM(s) Oral at bedtime    MEDICATIONS  (PRN):      Allergies    No Known Allergies    Intolerances        Vital Signs Last 24 Hrs  T(C): 36.6 (13 Aug 2019 07:45), Max: 37.2 (12 Aug 2019 15:44)  T(F): 97.8 (13 Aug 2019 07:45), Max: 99 (12 Aug 2019 15:44)  HR: 50 (13 Aug 2019 07:45) (47 - 55)  BP: 105/64 (13 Aug 2019 07:45) (94/53 - 115/57)  BP(mean): --  RR: 18 (13 Aug 2019 07:45) (17 - 20)  SpO2: 100% (13 Aug 2019 07:45) (98% - 100%)    I&O's Summary        PHYSICAL EXAM:  TELE:   Constitutional: NAD, awake and alert, well-developed  HEENT: Moist Mucous Membranes, Anicteric  Pulmonary: Non-labored, breath sounds are clear bilaterally, No wheezing, crackles or rhonchi  Cardiovascular: Regular, S1 and S2 nl, No murmurs, rubs, gallops or clicks  Gastrointestinal: Bowel Sounds present, soft, nontender.   Lymph: No lymphadenopathy. No peripheral edema.  Skin: No visible rashes or ulcers.  Psych:  Mood & affect appropriate    LABS: All Labs Reviewed:                        9.6    7.32  )-----------( 126      ( 13 Aug 2019 07:04 )             30.0                         10.4   11.83 )-----------( 137      ( 12 Aug 2019 08:21 )             32.2                         10.9   4.27  )-----------( 152      ( 11 Aug 2019 11:45 )             34.0     13 Aug 2019 07:04    142    |  109    |  38     ----------------------------<  85     4.3     |  27     |  1.10   12 Aug 2019 08:21    142    |  110    |  43     ----------------------------<  104    4.6     |  25     |  1.40   11 Aug 2019 11:45    144    |  111    |  31     ----------------------------<  131    3.8     |  25     |  1.40     Ca    8.4        13 Aug 2019 07:04  Ca    8.1        12 Aug 2019 08:21  Ca    7.4        11 Aug 2019 11:45  Mg     2.4       13 Aug 2019 07:04  Mg     2.3       11 Aug 2019 11:45    TPro  7.1    /  Alb  2.7    /  TBili  0.6    /  DBili  x      /  AST  32     /  ALT  18     /  AlkPhos  95     11 Aug 2019 11:45    PT/INR - ( 11 Aug 2019 11:45 )   PT: 19.5 sec;   INR: 1.68 ratio         PTT - ( 11 Aug 2019 11:45 )  PTT:37.8 sec  CARDIAC MARKERS ( 12 Aug 2019 13:54 )  .036 ng/mL / x     / 135 U/L / x     / 1.1 ng/mL  CARDIAC MARKERS ( 12 Aug 2019 08:21 )  .037 ng/mL / x     / 99 U/L / x     / <1.0 ng/mL  CARDIAC MARKERS ( 11 Aug 2019 11:45 )  <.015 ng/mL / x     / 56 U/L / x     / <1.0 ng/mL         ECG:    Echo:    Radiology:           Marques Batista [facility]   Cardiology [facility] -- Prisca London, Leti, Lottie, Bharat Ceron Savella  Office # [phone]      Follow Up:    Jaime  Subjective/Observations:   No events overnight resting comfortably in bed.  No complaints of chest pain, dyspnea, or palpitations reported. No signs of orthopnea or PND.     REVIEW OF SYSTEMS: All other review of systems is negative unless indicated above    PAST MEDICAL & SURGICAL HISTORY:  Atrial fibrillation  CHF (congestive heart failure)  HTN (hypertension)  High cholesterol  Arrhythmia  Mitral regurgitation  Pleural effusion  H/O aortic valve repair  S/P mitral valve repair  S/P CABG (coronary artery bypass graft)  No significant past surgical history      MEDICATIONS  (STANDING):  aspirin enteric coated 81 milliGRAM(s) Oral daily  enoxaparin Injectable 60 milliGRAM(s) SubCutaneous daily  furosemide    Tablet 40 milliGRAM(s) Oral daily  simvastatin 20 milliGRAM(s) Oral at bedtime    MEDICATIONS  (PRN):      Allergies    No Known Allergies    Intolerances        Vital Signs Last 24 Hrs  T(C): 36.6 (13 Aug 2019 07:45), Max: 37.2 (12 Aug 2019 15:44)  T(F): 97.8 (13 Aug 2019 07:45), Max: 99 (12 Aug 2019 15:44)  HR: 50 (13 Aug 2019 07:45) (47 - 55)  BP: 105/64 (13 Aug 2019 07:45) (94/53 - 115/57)  BP(mean): --  RR: 18 (13 Aug 2019 07:45) (17 - 20)  SpO2: 100% (13 Aug 2019 07:45) (98% - 100%)    I&O's Summary        PHYSICAL EXAM:  TELE: jucntional  Constitutional: NAD, awake and alert, well-developed  HEENT: Moist Mucous Membranes, Anicteric  Pulmonary: Non-labored, breath sounds with crackles bilaterally at bases , No wheezing, or rhonchi   Cardiovascular: Regular, S1 and S2 nl, No murmurs, rubs, gallops or clicks  Gastrointestinal: Bowel Sounds present, soft, nontender.   Lymph: No lymphadenopathy. No peripheral edema.  Skin: No visible rashes or ulcers.  Psych:  Mood & affect appropriate    LABS: All Labs Reviewed:                        9.6    7.32  )-----------( 126      ( 13 Aug 2019 07:04 )             30.0                         10.4   11.83 )-----------( 137      ( 12 Aug 2019 08:21 )             32.2                         10.9   4.27  )-----------( 152      ( 11 Aug 2019 11:45 )             34.0     13 Aug 2019 07:04    142    |  109    |  38     ----------------------------<  85     4.3     |  27     |  1.10   12 Aug 2019 08:21    142    |  110    |  43     ----------------------------<  104    4.6     |  25     |  1.40   11 Aug 2019 11:45    144    |  111    |  31     ----------------------------<  131    3.8     |  25     |  1.40     Ca    8.4        13 Aug 2019 07:04  Ca    8.1        12 Aug 2019 08:21  Ca    7.4        11 Aug 2019 11:45  Mg     2.4       13 Aug 2019 07:04  Mg     2.3       11 Aug 2019 11:45    TPro  7.1    /  Alb  2.7    /  TBili  0.6    /  DBili  x      /  AST  32     /  ALT  18     /  AlkPhos  95     11 Aug 2019 11:45    PT/INR - ( 11 Aug 2019 11:45 )   PT: 19.5 sec;   INR: 1.68 ratio         PTT - ( 11 Aug 2019 11:45 )  PTT:37.8 sec  CARDIAC MARKERS ( 12 Aug 2019 13:54 )  .036 ng/mL / x     / 135 U/L / x     / 1.1 ng/mL  CARDIAC MARKERS ( 12 Aug 2019 08:21 )  .037 ng/mL / x     / 99 U/L / x     / <1.0 ng/mL  CARDIAC MARKERS ( 11 Aug 2019 11:45 )  <.015 ng/mL / x     / 56 U/L / x     / <1.0 ng/mL    ECG:  Junctional bradycardia with retrograde p waves.  RBBB.  LPFB.    Echo:  pending interpretation   Radiology:  < from: Xray Chest 1 View-PORTABLE IMMEDIATE (08.11.19 @ 10:40) >  EXAM:  XR CHEST PORTABLE IMMED 1V                            PROCEDURE DATE:  08/11/2019          INTERPRETATION:  CLINICAL INFORMATION: Chest pain.    TECHNIQUE: Frontal view of the chest   COMPARISON: None.    FINDINGS:    LUNGS/PLEURA: Small left pleural effusion with basilar atelectasis or   consolidation. No pneumothorax.  MEDIASTINUM: Cardiac silhouette is enlarged. Mitral valve replacement and   cardiac valve repair.  OTHER: Sternotomy.    IMPRESSION:     Small left pleural effusion with basilar atelectasis or consolidation.     ELIZABETH WHITE M.D., ATTENDING RADIOLOGIST  This document has been electronically signed. Aug 11 2019 11:02AM    < end of copied text >         Marques Batista ANP   Cardiology [facility] -- Prisca London, Leti, Lottie, Bharat Ceron Savella  Office # [phone]      Follow Up:    Jaime  Subjective/Observations:   No events overnight resting comfortably in bed.  No complaints of chest pain, dyspnea, or palpitations reported. No signs of orthopnea or PND.     REVIEW OF SYSTEMS: All other review of systems is negative unless indicated above    PAST MEDICAL & SURGICAL HISTORY:  Atrial fibrillation  CHF (congestive heart failure)  HTN (hypertension)  High cholesterol  Arrhythmia  Mitral regurgitation  Pleural effusion  H/O aortic valve repair  S/P mitral valve repair  S/P CABG (coronary artery bypass graft)  No significant past surgical history      MEDICATIONS  (STANDING):  aspirin enteric coated 81 milliGRAM(s) Oral daily  enoxaparin Injectable 60 milliGRAM(s) SubCutaneous daily  furosemide    Tablet 40 milliGRAM(s) Oral daily  simvastatin 20 milliGRAM(s) Oral at bedtime    MEDICATIONS  (PRN):      Allergies    No Known Allergies    Intolerances        Vital Signs Last 24 Hrs  T(C): 36.6 (13 Aug 2019 07:45), Max: 37.2 (12 Aug 2019 15:44)  T(F): 97.8 (13 Aug 2019 07:45), Max: 99 (12 Aug 2019 15:44)  HR: 50 (13 Aug 2019 07:45) (47 - 55)  BP: 105/64 (13 Aug 2019 07:45) (94/53 - 115/57)  BP(mean): --  RR: 18 (13 Aug 2019 07:45) (17 - 20)  SpO2: 100% (13 Aug 2019 07:45) (98% - 100%)    I&O's Summary        PHYSICAL EXAM:  TELE:  Low lying atrial rhythm   Constitutional: NAD, awake and alert, well-developed  HEENT: Moist Mucous Membranes, Anicteric  Pulmonary: Non-labored, breath sounds with crackles bilaterally at bases , No wheezing, or rhonchi   Cardiovascular: Regular, S1 and S2 nl, No murmurs, rubs, gallops or clicks  Gastrointestinal: Bowel Sounds present, soft, nontender.   Lymph: No lymphadenopathy. No peripheral edema.  Skin: No visible rashes or ulcers.  Psych:  Mood & affect appropriate    LABS: All Labs Reviewed:                        9.6    7.32  )-----------( 126      ( 13 Aug 2019 07:04 )             30.0                         10.4   11.83 )-----------( 137      ( 12 Aug 2019 08:21 )             32.2                         10.9   4.27  )-----------( 152      ( 11 Aug 2019 11:45 )             34.0     13 Aug 2019 07:04    142    |  109    |  38     ----------------------------<  85     4.3     |  27     |  1.10   12 Aug 2019 08:21    142    |  110    |  43     ----------------------------<  104    4.6     |  25     |  1.40   11 Aug 2019 11:45    144    |  111    |  31     ----------------------------<  131    3.8     |  25     |  1.40     Ca    8.4        13 Aug 2019 07:04  Ca    8.1        12 Aug 2019 08:21  Ca    7.4        11 Aug 2019 11:45  Mg     2.4       13 Aug 2019 07:04  Mg     2.3       11 Aug 2019 11:45    TPro  7.1    /  Alb  2.7    /  TBili  0.6    /  DBili  x      /  AST  32     /  ALT  18     /  AlkPhos  95     11 Aug 2019 11:45    PT/INR - ( 11 Aug 2019 11:45 )   PT: 19.5 sec;   INR: 1.68 ratio         PTT - ( 11 Aug 2019 11:45 )  PTT:37.8 sec  CARDIAC MARKERS ( 12 Aug 2019 13:54 )  .036 ng/mL / x     / 135 U/L / x     / 1.1 ng/mL  CARDIAC MARKERS ( 12 Aug 2019 08:21 )  .037 ng/mL / x     / 99 U/L / x     / <1.0 ng/mL  CARDIAC MARKERS ( 11 Aug 2019 11:45 )  <.015 ng/mL / x     / 56 U/L / x     / <1.0 ng/mL    ECG:  Junctional bradycardia with retrograde p waves.  RBBB.  LPFB.    Echo:  pending interpretation   Radiology:  < from: Xray Chest 1 View-PORTABLE IMMEDIATE (08.11.19 @ 10:40) >  EXAM:  XR CHEST PORTABLE IMMED 1V                            PROCEDURE DATE:  08/11/2019          INTERPRETATION:  CLINICAL INFORMATION: Chest pain.    TECHNIQUE: Frontal view of the chest   COMPARISON: None.    FINDINGS:    LUNGS/PLEURA: Small left pleural effusion with basilar atelectasis or   consolidation. No pneumothorax.  MEDIASTINUM: Cardiac silhouette is enlarged. Mitral valve replacement and   cardiac valve repair.  OTHER: Sternotomy.    IMPRESSION:     Small left pleural effusion with basilar atelectasis or consolidation.     ELIZABETH WHITE M.D., ATTENDING RADIOLOGIST  This document has been electronically signed. Aug 11 2019 11:02AM    < end of copied text >         Marques Batista ANP   Cardiology [facility] -- Prisca London, Leti, Lottie, Bharat Ceron Savella  Office # [phone]      Follow Up:    Jaime    Subjective/Observations:   No events overnight resting comfortably in bed.  No complaints of chest pain, dyspnea, or palpitations reported. No signs of orthopnea or PND. Spoke with Granddaughter who states that the pt is at her baseline.     REVIEW OF SYSTEMS: All other review of systems is negative unless indicated above    PAST MEDICAL & SURGICAL HISTORY:  Atrial fibrillation  CHF (congestive heart failure)  HTN (hypertension)  High cholesterol  Arrhythmia  Mitral regurgitation  Pleural effusion  H/O aortic valve repair  S/P mitral valve repair  S/P CABG (coronary artery bypass graft)  No significant past surgical history      MEDICATIONS  (STANDING):  aspirin enteric coated 81 milliGRAM(s) Oral daily  enoxaparin Injectable 60 milliGRAM(s) SubCutaneous daily  furosemide    Tablet 40 milliGRAM(s) Oral daily  simvastatin 20 milliGRAM(s) Oral at bedtime    MEDICATIONS  (PRN):      Allergies    No Known Allergies    Intolerances        Vital Signs Last 24 Hrs  T(C): 36.6 (13 Aug 2019 07:45), Max: 37.2 (12 Aug 2019 15:44)  T(F): 97.8 (13 Aug 2019 07:45), Max: 99 (12 Aug 2019 15:44)  HR: 50 (13 Aug 2019 07:45) (47 - 55)  BP: 105/64 (13 Aug 2019 07:45) (94/53 - 115/57)  BP(mean): --  RR: 18 (13 Aug 2019 07:45) (17 - 20)  SpO2: 100% (13 Aug 2019 07:45) (98% - 100%)    I&O's Summary        PHYSICAL EXAM:  TELE:  possibly Low lying atrial rhythm 50-70  Constitutional: NAD, awake    HEENT: Moist Mucous Membranes, Anicteric  Pulmonary: Decreased breath sounds b/l. No rales, crackles or wheeze appreciated.   Cardiovascular: Regular, S1 and S2 nl, No murmurs, rubs, gallops or clicks  Gastrointestinal: Bowel Sounds present, soft, nontender.   Lymph: No lymphadenopathy. No peripheral edema.  Skin: No visible rashes or ulcers.  Psych:  Mood & affect appropriate    LABS: All Labs Reviewed:                        9.6    7.32  )-----------( 126      ( 13 Aug 2019 07:04 )             30.0                         10.4   11.83 )-----------( 137      ( 12 Aug 2019 08:21 )             32.2                         10.9   4.27  )-----------( 152      ( 11 Aug 2019 11:45 )             34.0     13 Aug 2019 07:04    142    |  109    |  38     ----------------------------<  85     4.3     |  27     |  1.10   12 Aug 2019 08:21    142    |  110    |  43     ----------------------------<  104    4.6     |  25     |  1.40   11 Aug 2019 11:45    144    |  111    |  31     ----------------------------<  131    3.8     |  25     |  1.40     Ca    8.4        13 Aug 2019 07:04  Ca    8.1        12 Aug 2019 08:21  Ca    7.4        11 Aug 2019 11:45  Mg     2.4       13 Aug 2019 07:04  Mg     2.3       11 Aug 2019 11:45    TPro  7.1    /  Alb  2.7    /  TBili  0.6    /  DBili  x      /  AST  32     /  ALT  18     /  AlkPhos  95     11 Aug 2019 11:45    PT/INR - ( 11 Aug 2019 11:45 )   PT: 19.5 sec;   INR: 1.68 ratio         PTT - ( 11 Aug 2019 11:45 )  PTT:37.8 sec  CARDIAC MARKERS ( 12 Aug 2019 13:54 )  .036 ng/mL / x     / 135 U/L / x     / 1.1 ng/mL  CARDIAC MARKERS ( 12 Aug 2019 08:21 )  .037 ng/mL / x     / 99 U/L / x     / <1.0 ng/mL  CARDIAC MARKERS ( 11 Aug 2019 11:45 )  <.015 ng/mL / x     / 56 U/L / x     / <1.0 ng/mL    ECG:  Junctional bradycardia with retrograde p waves.  RBBB.  LPFB.    Echo:  pending interpretation   Radiology:  < from: Xray Chest 1 View-PORTABLE IMMEDIATE (08.11.19 @ 10:40) >  EXAM:  XR CHEST PORTABLE IMMED 1V                            PROCEDURE DATE:  08/11/2019          INTERPRETATION:  CLINICAL INFORMATION: Chest pain.    TECHNIQUE: Frontal view of the chest   COMPARISON: None.    FINDINGS:    LUNGS/PLEURA: Small left pleural effusion with basilar atelectasis or   consolidation. No pneumothorax.  MEDIASTINUM: Cardiac silhouette is enlarged. Mitral valve replacement and   cardiac valve repair.  OTHER: Sternotomy.    IMPRESSION:     Small left pleural effusion with basilar atelectasis or consolidation.     ELIZABETH WHITE M.D., ATTENDING RADIOLOGIST  This document has been electronically signed. Aug 11 2019 11:02AM    < end of copied text >         Marques Batista ANP   Cardiology

## 2019-08-13 NOTE — PROGRESS NOTE ADULT - SUBJECTIVE AND OBJECTIVE BOX
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 She was at her functional baseline early this morning, and then was subsequently found by family to be lying on the couch essentially unresponsive with pallor, cyanosis of her lips and foaming at the mouth. EMS was called. After several minutes, family was able to arouse her, and patient reported feeling dizzy and nauseous. As per chart review, EMS found patient  weak appearing, hypotensive and bradycardic, and patient was given 1/2 amp of atropine IV and heart rate improved from mid 30s to mid 40s-50s with subsequent rise in BP. Patient reports that she is still mildy dizzy, weak, and fatigued, but feeling better. She is unsure if she lost consciousness during the episode. She denies chest pain, shortness of breath, diaphoresis, fever, chills, nausea, abdominal pain, recent fall, recent change in medications.    ED vitals: T: 97.9, HR: 46 BP: 108/55 RR: 18 O2 saturation: initially 99 on nonrebreather then 100 on NC 2 L. In the ED patient was given: IVF bolus X2, glucagon 1mg IV. EKG: Wide QRS, RBBB, left posterior fascicle block, bifascicular block. One episode of vomiting with chest pain following glucagon administration. EKG unchanged. Labs notable for WBC 4.27, H/h 10.9/34, BUN/Cr 31/1.4 (baseline unknown), initial troponin <.015. CXR: Small left pleural effusion with basilar atelectasis or consolidation.     Patient was evaluated by cardio consult (Dr. Ceron) in the ED. (11 Aug 2019 11:33)    ----  INTERVAL HPI/OVERNIGHT EVENTS: Pt seen and evaluated at the bedside. No acute overnight events occurred. Overnight, patient's HR was upper 40s-50s, with some junctional rhythms but asymptomatic. Patient's family at bedside. Patient is resting comfortable on 2L nasal cannula. Patient states she is not coughing today but states she had blood tinged sputum yesterday. Patient denies any dizziness, chest pain, SOB, fever, chills, nausea at this time.     ----  PAST MEDICAL & SURGICAL HISTORY:  Atrial fibrillation  CHF (congestive heart failure)  HTN (hypertension)  High cholesterol  Arrhythmia  Mitral regurgitation  Pleural effusion  H/O aortic valve repair  S/P mitral valve repair  S/P CABG (coronary artery bypass graft)  No significant past surgical history    FAMILY HISTORY:  No significant family history    Allergies  No Known Allergies    Intolerances    ----  REVIEW OF SYSTEMS:  CONSTITUTIONAL: denies fever, chills, fatigue, weakness  HEENT: denies blurred vision, sore throat  CARDIOVASCULAR: denies chest pain, chest pressure, palpitations  RESPIRATORY: denies shortness of breath, sputum production  GASTROINTESTINAL: denies nausea, vomiting, diarrhea, abdominal pain  GENITOURINARY: denies dysuria, discharge  NEUROLOGICAL: denies numbness, headache, focal weakness  MUSCULOSKELETAL: denies new joint pain, muscle aches  HEMATOLOGIC: denies gross bleeding, bruising  SKIN: denies new lesions, rash    ----  PHYSICAL EXAM:  GENERAL: patient appears well, no acute distress, appropriately interactive  EYES: sclera clear, no exudates  ENMT: moist mucous membranes  NECK: supple, soft  LUNGS: presence of diffuse mild wheezing   HEART: soft S1/S2, regular rate and rhythm, no murmurs noted, no noted edema to b/l LE  GASTROINTESTINAL: abdomen is soft, nontender, nondistended, normoactive bowel sounds, no palpable masses  INTEGUMENT: appears well perfused, no jaundice noted  MUSCULOSKELETAL: no clubbing or cyanosis, no obvious deformity  NEUROLOGIC: awake, alert, oriented x3, good muscle tone in 4 extremities, no obvious sensory deficits    T(C): 36.6 (08-13-19 @ 07:45), Max: 37.2 (08-12-19 @ 15:44)  HR: 50 (08-13-19 @ 07:45) (50 - 55)  BP: 105/64 (08-13-19 @ 07:45) (94/53 - 115/57)  RR: 18 (08-13-19 @ 07:45) (17 - 20)  SpO2: 100% (08-13-19 @ 07:45) (98% - 100%)  Wt(kg): --    ----  I&O's Summary    LABS:                        9.6    7.32  )-----------( 126      ( 13 Aug 2019 07:04 )             30.0     08-13    142  |  109<H>  |  38<H>  ----------------------------<  85  4.3   |  27  |  1.10    Ca    8.4<L>      13 Aug 2019 07:04  Mg     2.4     08-13    TPro  7.1  /  Alb  2.7<L>  /  TBili  0.6  /  DBili  x   /  AST  32  /  ALT  18  /  AlkPhos  95  08-11    PT/INR - ( 11 Aug 2019 11:45 )   PT: 19.5 sec;   INR: 1.68 ratio      PTT - ( 11 Aug 2019 11:45 )  PTT:37.8 sec    CAPILLARY BLOOD GLUCOSE    ----  Personally reviewed:  Vital sign trends: [ x ] yes    [  ] no     [  ] n/a  Laboratory results: [ x ] yes    [  ] no     [  ] n/a  Radiology results: [ x ] yes    [  ] no     [  ] n/a  Culture results: [  ] yes    [  ] no     [ x ] n/a  Consultant recommendations: [ x ] yes    [  ] no     [  ] n/a Patient is a 85y old  Female who presents with a chief complaint of Bradycardia (13 Aug 2019 08:20)    FROM ADMISSION H+P:   HPI:  85 year old Chinese-speaking female with a past medical history of CHF (last echo 2 months ago normal per family), MI in s/p mitral valve replacement, aortic valve repair in 2015, atrial fibrillation on eliquis,  HLD, and HTN, BIBEMS s/p developing weakness and fatigue at 9:30AM. She was at her functional baseline early this morning, and then was subsequently found by family to be lying on the couch essentially unresponsive with pallor, cyanosis of her lips and foaming at the mouth. EMS was called. After several minutes, family was able to arouse her, and patient reported feeling dizzy and nauseous. As per chart review, EMS found patient  weak appearing, hypotensive and bradycardic, and patient was given 1/2 amp of atropine IV and heart rate improved from mid 30s to mid 40s-50s with subsequent rise in BP. Patient reports that she is still mildy dizzy, weak, and fatigued, but feeling better. She is unsure if she lost consciousness during the episode. She denies chest pain, shortness of breath, diaphoresis, fever, chills, nausea, abdominal pain, recent fall, recent change in medications.    ED vitals: T: 97.9, HR: 46 BP: 108/55 RR: 18 O2 saturation: initially 99 on nonrebreather then 100 on NC 2 L. In the ED patient was given: IVF bolus X2, glucagon 1mg IV. EKG: Wide QRS, RBBB, left posterior fascicle block, bifascicular block. One episode of vomiting with chest pain following glucagon administration. EKG unchanged. Labs notable for WBC 4.27, H/h 10.9/34, BUN/Cr 31/1.4 (baseline unknown), initial troponin <.015. CXR: Small left pleural effusion with basilar atelectasis or consolidation.     Patient was evaluated by cardio consult (Dr. Ceron) in the ED. (11 Aug 2019 11:33)    ----  INTERVAL HPI/OVERNIGHT EVENTS: Pt seen and evaluated at the bedside. No acute overnight events occurred. Overnight, patient's HR was upper 40s-50s, with some junctional rhythms but asymptomatic. Patient's family at bedside. Patient is resting comfortable on 2L nasal cannula. Patient states she is not coughing today but states she had blood tinged sputum yesterday. Patient denies any dizziness, chest pain, SOB, fever, chills, nausea at this time.     ----  PAST MEDICAL & SURGICAL HISTORY:  Atrial fibrillation  CHF (congestive heart failure)  HTN (hypertension)  High cholesterol  Arrhythmia  Mitral regurgitation  Pleural effusion  H/O aortic valve repair  S/P mitral valve repair  S/P CABG (coronary artery bypass graft)  No significant past surgical history    FAMILY HISTORY:  No significant family history    Allergies  No Known Allergies    Intolerances    ----  REVIEW OF SYSTEMS:  CONSTITUTIONAL: denies fever, chills, fatigue, weakness  HEENT: denies blurred vision, sore throat  CARDIOVASCULAR: denies chest pain, chest pressure, palpitations  RESPIRATORY: admits sputum production. admits cough.   GASTROINTESTINAL: denies nausea, vomiting, diarrhea, abdominal pain  GENITOURINARY: denies dysuria, discharge  NEUROLOGICAL: denies numbness, headache, focal weakness  MUSCULOSKELETAL: denies new joint pain, muscle aches  HEMATOLOGIC: denies gross bleeding, bruising  SKIN: denies new lesions, rash  10 systems reviewed and negative unless otherwise noted     ----  PHYSICAL EXAM:  GENERAL: patient appears well, no acute distress, appropriately interactive   ENMT: moist mucous membranes, no lesions  LUNGS: presence of diffuse mild wheezing   HEART: soft S1/S2, regular rate and rhythm, no murmurs noted, no noted edema to b/l LE  GASTROINTESTINAL: abdomen is soft, nontender, nondistended, normoactive bowel sounds, no palpable masses  INTEGUMENT: appears well perfused, no jaundice noted  MUSCULOSKELETAL: no clubbing or cyanosis, no obvious deformity  NEUROLOGIC: awake, alert, oriented x3, good muscle tone in 4 extremities, no obvious sensory deficits    T(C): 36.6 (08-13-19 @ 07:45), Max: 37.2 (08-12-19 @ 15:44)  HR: 50 (08-13-19 @ 07:45) (50 - 55)  BP: 105/64 (08-13-19 @ 07:45) (94/53 - 115/57)  RR: 18 (08-13-19 @ 07:45) (17 - 20)  SpO2: 100% (08-13-19 @ 07:45) (98% - 100%)  Wt(kg): --    ----  I&O's Summary    LABS:                        9.6    7.32  )-----------( 126      ( 13 Aug 2019 07:04 )             30.0     08-13    142  |  109<H>  |  38<H>  ----------------------------<  85  4.3   |  27  |  1.10    Ca    8.4<L>      13 Aug 2019 07:04  Mg     2.4     08-13    TPro  7.1  /  Alb  2.7<L>  /  TBili  0.6  /  DBili  x   /  AST  32  /  ALT  18  /  AlkPhos  95  08-11    PT/INR - ( 11 Aug 2019 11:45 )   PT: 19.5 sec;   INR: 1.68 ratio      PTT - ( 11 Aug 2019 11:45 )  PTT:37.8 sec    CAPILLARY BLOOD GLUCOSE    ----  Personally reviewed:  Vital sign trends: [ x ] yes    [  ] no     [  ] n/a  Laboratory results: [ x ] yes    [  ] no     [  ] n/a  Radiology results: [ x ] yes    [  ] no     [  ] n/a  Culture results: [  ] yes    [  ] no     [ x ] n/a  Consultant recommendations: [ x ] yes    [  ] no     [  ] n/a

## 2019-08-13 NOTE — PROGRESS NOTE ADULT - PROBLEM SELECTOR PLAN 3
- S/p  1.5L NS boluses  - CKMB <1, CPK <1.8  - Troponin was <.015 -> .037 -> .035, no need to further trend.  - Continuous monitoring on telemetry

## 2019-08-13 NOTE — SWALLOW BEDSIDE ASSESSMENT ADULT - SWALLOW EVAL: RECOMMENDED FEEDING/EATING TECHNIQUES
no straws/check mouth frequently for oral residue/pocketing/position upright (90 degrees)/maintain upright posture during/after eating for 30 mins/oral hygiene/allow for swallow between intakes/alternate food with liquid/hard swallow w/ each bite or sip/small sips/bites

## 2019-08-13 NOTE — DISCHARGE NOTE PROVIDER - PROVIDER TOKENS
PROVIDER:[TOKEN:[[id number]]],FREE:[LAST:[Pineda],FIRST:[Linn],PHONE:[(   )    -],FAX:[(   )    -],ADDRESS:[Cardiology]]

## 2019-08-13 NOTE — DISCHARGE NOTE PROVIDER - NSDCCPCAREPLAN_GEN_ALL_CORE_FT
PRINCIPAL DISCHARGE DIAGNOSIS  Diagnosis: Bradycardia  Assessment and Plan of Treatment: You were admitted with low heart rates in the 30s. Your home medications of metoprolol and propafenone was discontinued. You were monitored for possible need of pacemaker but can be evaluated outpatient. Your heart rates are now in 50-60s overnight and 70s with ambulation. Please follow up with cardiologist within 1 week.      SECONDARY DISCHARGE DIAGNOSES  Diagnosis: Pneumonia  Assessment and Plan of Treatment: You were found to have pneumonia, which is infection of the lungs. You were started on IV antibiotics but will be transferred to oral antibiotics. You will need a chest xray in 6-8 weeks to check for resolution of pneumonia. Please follow up with PMD outpatient. PRINCIPAL DISCHARGE DIAGNOSIS  Diagnosis: Bradycardia  Assessment and Plan of Treatment: You were admitted with low heart rates in the 30s. Your home medications of metoprolol and propafenone were discontinued. You were monitored for possible need of pacemaker but can be evaluated outpatient. Your heart rates are now in 50-60s overnight and 70s with ambulation. Please follow up with cardiologist within 1 week.      SECONDARY DISCHARGE DIAGNOSES  Diagnosis: Pneumonia  Assessment and Plan of Treatment: You were found to have pneumonia, which is infection of the lungs. You were started on IV antibiotics but will be transferred to oral antibiotics. You will need a chest xray in 6-8 weeks to check for resolution of pneumonia. Please follow up with PMD outpatient.    Diagnosis: Atrial fibrillation  Assessment and Plan of Treatment: You have atrial fibrillation. Your anticoagulation medication was changed to lovenox while in the hospital for possbility of pacemaker placement. You will be switched back to eliquis upon dishcharge. PRINCIPAL DISCHARGE DIAGNOSIS  Diagnosis: Bradycardia  Assessment and Plan of Treatment: You were admitted with low heart rates in the 30s. Your home medications of metoprolol and propafenone were discontinued. You were monitored for possible need of pacemaker but can be evaluated outpatient. Your heart rates are now in 50-60s overnight and 70s with ambulation. Please follow up with cardiologist within 1 week.      SECONDARY DISCHARGE DIAGNOSES  Diagnosis: Pneumonia  Assessment and Plan of Treatment: You were found to have pneumonia, which is infection of the lungs. You were started on IV antibiotics but will be transferred to oral antibiotics. Take cefuroxime 500mg twice a day orally for the next 5 days. Take baccid, 1 capsule, 3 times a day with meals for the next 5 days. You will need a chest xray in 6-8 weeks to check for resolution of pneumonia. Please follow up with PMD outpatient.    Diagnosis: Atrial fibrillation  Assessment and Plan of Treatment: You have atrial fibrillation. Your anticoagulation medication was changed to lovenox while in the hospital for possbility of pacemaker placement. You will be switched back to eliquis upon dishcharge.

## 2019-08-13 NOTE — PROGRESS NOTE ADULT - PROBLEM SELECTOR PLAN 1
- WBC 4.27 on admission, 11.83 on 8/12/19, 7.32 today  - CXR on admission showed small left pleural effusion with bibasilar atelectasis or consolidation.  - Repeat CXR showed new RUL pneumonia  - Patient started IV azithromycin 500mg and IV rocephin 1000mg  - Patient started on baccid  - Duonebs BID ordered PRN for SOB or wheezing  - CT chest w/o contrast ordered, f/u  - Dr. Bautista, pulmonologist consulted, will f/u recs - WBC 4.27 on admission, 11.83 on 8/12/19, 7.32 today  - CXR on admission showed small left pleural effusion with bibasilar atelectasis or consolidation.  - Repeat CXR showed new RUL pneumonia  - Patient started IV azithromycin 500mg and IV rocephin 1000mg  - Patient started on baccid  - Duonebs BID ordered PRN for SOB or wheezing  - CT chest: Small loculated left-sided pleural effusion and associated with pleural   thickening, correlate for complex pleural collection or empyema. Inferior lingula and left lower lobe airspace consolidation could reflect chronic atelectasis and/or pneumonia. Patchy groundglass opacities predominantly in the right upper lung zones as discussed which may reflect edema versus infection/pneumonia.  Small to moderate right-sided pleural effusion.  -S/s recs modified barium swallow to r/o silent penetration/aspiration, will order pending patient and family's agreement  - Dr. Bautista, pulmonologist consulted, will f/u recs

## 2019-08-13 NOTE — CONSULT NOTE ADULT - PROBLEM SELECTOR RECOMMENDATION 9
pna eval - labs and imaging reviewed, Ct chest reviewed, eff and atelectasis and airspace disease noted  will check Swallow eval - keep HOB elev - asp prec - oral hygiene -   extensive cardiac hx - on PO LASIX - TTE done - report pending -  assist with ADL  out of bed as tolerated  WBC normal -   monitor vs and HD and Sat  effusions noted on CT chest - no need for intervention at present  will follow and monitor  cont curr medical rx regimen  work up and evaluation under way -

## 2019-08-13 NOTE — DISCHARGE NOTE PROVIDER - HOSPITAL COURSE
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 She was at her functional baseline early this morning, and then was subsequently found by family to be lying on the couch essentially unresponsive with pallor, cyanosis of her lips and foaming at the mouth. EMS was called. After several minutes, family was able to arouse her, and patient reported feeling dizzy and nauseous. As per chart review, EMS found patient  weak appearing, hypotensive and bradycardic, and patient was given 1/2 amp of atropine IV and heart rate improved from mid 30s to mid 40s-50s with subsequent rise in BP. Patient reports that she is still mildy dizzy, weak, and fatigued, but feeling better. She is unsure if she lost consciousness during the episode. She denies chest pain, shortness of breath, diaphoresis, fever, chills, nausea, abdominal pain, recent fall, recent change in medications.        ED vitals: T: 97.9, HR: 46 BP: 108/55 RR: 18 O2 saturation: initially 99 on nonrebreather then 100 on NC 2 L. In the ED patient was given: IVF bolus X2, glucagon 1mg IV. EKG: Wide QRS, RBBB, left posterior fascicle block, bifascicular block. One episode of vomiting with chest pain following glucagon administration. EKG unchanged. Labs notable for WBC 4.27, H/h 10.9/34, BUN/Cr 31/1.4 (baseline unknown), initial troponin <.015. CXR: Small left pleural effusion with basilar atelectasis or consolidation.         Patient was evaluated by cardio consult (Dr. Ceron) in the ED. (11 Aug 2019 11:33)        ---    HOSPITAL COURSE:     Patient admitted for bradycardia. Cardio Dr. London's group was consulted. Patient's home medications of metoprolol and propafenone were held in the setting of hearts rates in the 40s-50s and SBPs . Echo showed __________. Troponin levels were trended and remained negative. Patient's home med of eliquis for anticogulation given history of afib was held and patient instead received lovenox 60subQ daily in anticipation of possible pacemaker placement. Patient should be evaluated by electrophysiologist outpatient. While hospitalized, patient developed an elevated wbc and was found to have RUL pneumonia on CXR. Patient was started on IV azithromycin and IV rocephin, as well as baccid, nebulizers and mucinex. CT chest showed small loculated left-sided pleural effusion, inferior lingula and left lower lobe airspace consolidation, and patchy groundglass opacities predominantly in the right upper lung zones. Patient had a modified barium swallow to rule out aspiration which showed ______.             Patient was medically optimized and improved clinically throughout hospital course. Patient seen and examined on day of discharge.        Vital Signs    T(C): 36.7 (13 Aug 2019 20:02), Max: 37.2 (13 Aug 2019 04:46)    T(F): 98.1 (13 Aug 2019 20:02), Max: 99 (13 Aug 2019 04:46)    HR: 61 (13 Aug 2019 20:02) (50 - 63)    BP: 101/50 (13 Aug 2019 20:02) (94/53 - 105/64)    RR: 17 (13 Aug 2019 20:02) (16 - 20)    SpO2: 94% (13 Aug 2019 20:02) (94% - 100%)        Physical Exam:    General: well-developed, well-nourished, NAD    HEENT: normocephalic, atraumatic, EOMI, moist mucous membranes     Neck: supple, non-tender, no masses    Neurology: AAOx3, sensation intact    Respiratory: clear to auscultation bilaterally; no wheezes, rhonchi, or rales    CV: regular rate and rhythm, soft S1/S2, no murmurs, rubs, or gallops    Abdominal: soft, non-tender, non-distended, bowel sounds present    Extremities: no clubbing, cyanosis, or edema; palpable peripheral pulses    Musculoskeletal: no joint erythema or warmth, no joint swelling     Skin: warm, dry, normal color        Patient is medically stable for discharge to ____ with outpatient follow up.    ---    CONSULTANTS:     Dr. London's group, cardiology    Dr. Bautista, pulmonology         ---    FINAL DISCHARGE DIAGNOSIS LIST:    Please see last daily progress note for final discharge diagnoses ADMISSION H+P:        HPI:    85 year old Chinese-speaking female with a past medical history of CHF (last echo 2 months ago normal per family), MI in s/p mitral valve replacement, aortic valve repair in 2015, atrial fibrillation on eliquis,  HLD, and HTN, BIBEMS s/p developing weakness and fatigue at 9:30AM. She was at her functional baseline early this morning, and then was subsequently found by family to be lying on the couch essentially unresponsive with pallor, cyanosis of her lips and foaming at the mouth. EMS was called. After several minutes, family was able to arouse her, and patient reported feeling dizzy and nauseous. As per chart review, EMS found patient  weak appearing, hypotensive and bradycardic, and patient was given 1/2 amp of atropine IV and heart rate improved from mid 30s to mid 40s-50s with subsequent rise in BP. Patient reports that she is still mildy dizzy, weak, and fatigued, but feeling better. She is unsure if she lost consciousness during the episode. She denies chest pain, shortness of breath, diaphoresis, fever, chills, nausea, abdominal pain, recent fall, recent change in medications.        ED vitals: T: 97.9, HR: 46 BP: 108/55 RR: 18 O2 saturation: initially 99 on nonrebreather then 100 on NC 2 L. In the ED patient was given: IVF bolus X2, glucagon 1mg IV. EKG: Wide QRS, RBBB, left posterior fascicle block, bifascicular block. One episode of vomiting with chest pain following glucagon administration. EKG unchanged. Labs notable for WBC 4.27, H/h 10.9/34, BUN/Cr 31/1.4 (baseline unknown), initial troponin <.015. CXR: Small left pleural effusion with basilar atelectasis or consolidation.         Patient was evaluated by cardio consult (Dr. Ceron) in the ED. (11 Aug 2019 11:33)        ---    HOSPITAL COURSE:     Patient admitted for bradycardia. Cardio Dr. London's group was consulted. Patient's home medications of metoprolol and propafenone were held in the setting of hearts rates in the 40s-60s and SBPs . Echo showed EF of 35% with segmental wall motion abnormalities, which is consistent with previous .  Troponin levels were trended and remained negative. Patient's home med of eliquis for anticogulation given history of afib was held and patient instead received lovenox 60subQ daily in anticipation of possible pacemaker placement. However, patient will be discharged on eliquis since there is no indication for PPM inpatient. Patient should be evaluated by electrophysiologist outpatient. While hospitalized, patient developed an elevated wbc and was found to have RUL pneumonia on CXR. Patient was started on IV azithromycin and IV rocephin, as well as baccid, nebulizers and mucinex. CT chest showed small loculated left-sided pleural effusion, inferior lingula and left lower lobe airspace consolidation, and patchy groundglass opacities predominantly in the right upper lung zones. Patient had a modified barium swallow which recommended dysphagia 3 (soft solids) with thin liquids.        Patient was medically optimized and improved clinically throughout hospital course. Patient seen and examined on day of discharge.        Vital Signs Last 24 Hrs    T(C): 36.8 (15 Aug 2019 07:47), Max: 37.5 (15 Aug 2019 00:03)    T(F): 98.2 (15 Aug 2019 07:47), Max: 99.5 (15 Aug 2019 00:03)    HR: 75 (15 Aug 2019 10:00) (60 - 80)    BP: 109/74 (15 Aug 2019 10:00) (95/51 - 124/64)    BP(mean): --    RR: 16 (15 Aug 2019 07:47) (16 - 18)    SpO2: 96% (15 Aug 2019 10:00) (93% - 100%)        GENERAL: no acute distress    HEENT: NC/AT, EOMI, neck supple, moist mucous membranes    RESPIRATORY: presence of diffuse mild wheezing     CARDIOVASCULAR: soft, no murmurs, gallops, rubs    ABDOMINAL: soft s1,s2, non-tender, non-distended, positive bowel sounds     EXTREMITIES: no clubbing, cyanosis, or edema    NEUROLOGICAL: AAO x3, good muscle tone in 4 extremities, no obvious sensory deficits    SKIN: warm, dry, intact    MUSCULOSKELETAL: no gross joint deformity        Patient is medically stable for discharge to home with outpatient follow up.    ---    CONSULTANTS:     Dr. London's group, cardiology    Dr. Bautista, pulmonology         ---    FINAL DISCHARGE DIAGNOSIS LIST:    Please see last daily progress note for final discharge diagnoses ADMISSION H+P:        HPI:    85 year old Chinese-speaking female with a past medical history of CHF (last echo 2 months ago normal per family), MI in s/p mitral valve replacement, aortic valve repair in 2015, atrial fibrillation on eliquis,  HLD, and HTN, BIBEMS s/p developing weakness and fatigue at 9:30AM. She was at her functional baseline early this morning, and then was subsequently found by family to be lying on the couch essentially unresponsive with pallor, cyanosis of her lips and foaming at the mouth. EMS was called. After several minutes, family was able to arouse her, and patient reported feeling dizzy and nauseous. As per chart review, EMS found patient  weak appearing, hypotensive and bradycardic, and patient was given 1/2 amp of atropine IV and heart rate improved from mid 30s to mid 40s-50s with subsequent rise in BP. Patient reports that she is still mildy dizzy, weak, and fatigued, but feeling better. She is unsure if she lost consciousness during the episode. She denies chest pain, shortness of breath, diaphoresis, fever, chills, nausea, abdominal pain, recent fall, recent change in medications.        ED vitals: T: 97.9, HR: 46 BP: 108/55 RR: 18 O2 saturation: initially 99 on nonrebreather then 100 on NC 2 L. In the ED patient was given: IVF bolus X2, glucagon 1mg IV. EKG: Wide QRS, RBBB, left posterior fascicle block, bifascicular block. One episode of vomiting with chest pain following glucagon administration. EKG unchanged. Labs notable for WBC 4.27, H/h 10.9/34, BUN/Cr 31/1.4 (baseline unknown), initial troponin <.015. CXR: Small left pleural effusion with basilar atelectasis or consolidation.         Patient was evaluated by cardio consult (Dr. Ceron) in the ED. (11 Aug 2019 11:33)        ---    HOSPITAL COURSE:     Patient admitted for bradycardia. Cardio Dr. London's group was consulted. Patient's home medications of metoprolol and propafenone were held in the setting of hearts rates in the 40s-60s and SBPs . Echo showed EF of 35% with segmental wall motion abnormalities, which is consistent with previous .  Troponin levels were trended and remained negative. Patient's home med of eliquis for anticogulation given history of afib was held and patient instead received lovenox 60subQ daily in anticipation of possible pacemaker placement. However, patient will be discharged on eliquis since there is no indication for PPM inpatient. Patient should be evaluated by electrophysiologist outpatient. While hospitalized, patient developed an elevated wbc and was found to have RUL pneumonia on CXR. Patient was started on IV azithromycin and IV rocephin, as well as baccid, nebulizers and mucinex. Patient to be discharged on ceftin 500mg PO BID for 5 days. CT chest showed small loculated left-sided pleural effusion, inferior lingula and left lower lobe airspace consolidation, and patchy groundglass opacities predominantly in the right upper lung zones. Patient had a modified barium swallow which recommended dysphagia 3 (soft solids) with thin liquids.        Patient was medically optimized and improved clinically throughout hospital course. Patient seen and examined on day of discharge.        Vital Signs Last 24 Hrs    T(C): 36.8 (15 Aug 2019 07:47), Max: 37.5 (15 Aug 2019 00:03)    T(F): 98.2 (15 Aug 2019 07:47), Max: 99.5 (15 Aug 2019 00:03)    HR: 75 (15 Aug 2019 10:00) (60 - 80)    BP: 109/74 (15 Aug 2019 10:00) (95/51 - 124/64)    BP(mean): --    RR: 16 (15 Aug 2019 07:47) (16 - 18)    SpO2: 96% (15 Aug 2019 10:00) (93% - 100%)        GENERAL: no acute distress    HEENT: NC/AT, EOMI, neck supple, moist mucous membranes    RESPIRATORY: presence of diffuse mild wheezing     CARDIOVASCULAR: soft, no murmurs, gallops, rubs    ABDOMINAL: soft s1,s2, non-tender, non-distended, positive bowel sounds     EXTREMITIES: no clubbing, cyanosis, or edema    NEUROLOGICAL: AAO x3, good muscle tone in 4 extremities, no obvious sensory deficits    SKIN: warm, dry, intact    MUSCULOSKELETAL: no gross joint deformity        Patient is medically stable for discharge to home with outpatient follow up.    ---    CONSULTANTS:     Dr. London's group, cardiology    Dr. Bautista, pulmonology         ---    FINAL DISCHARGE DIAGNOSIS LIST:    Please see last daily progress note for final discharge diagnoses ADMISSION H+P:        HPI:    85 year old Chinese-speaking female with a past medical history of CHF (last echo 2 months ago normal per family), MI in s/p mitral valve replacement, aortic valve repair in 2015, atrial fibrillation on eliquis,  HLD, and HTN, BIBEMS s/p developing weakness and fatigue at 9:30AM. She was at her functional baseline early this morning, and then was subsequently found by family to be lying on the couch essentially unresponsive with pallor, cyanosis of her lips and foaming at the mouth. EMS was called. After several minutes, family was able to arouse her, and patient reported feeling dizzy and nauseous. As per chart review, EMS found patient  weak appearing, hypotensive and bradycardic, and patient was given 1/2 amp of atropine IV and heart rate improved from mid 30s to mid 40s-50s with subsequent rise in BP. Patient reports that she is still mildy dizzy, weak, and fatigued, but feeling better. She is unsure if she lost consciousness during the episode. She denies chest pain, shortness of breath, diaphoresis, fever, chills, nausea, abdominal pain, recent fall, recent change in medications.        ED vitals: T: 97.9, HR: 46 BP: 108/55 RR: 18 O2 saturation: initially 99 on nonrebreather then 100 on NC 2 L. In the ED patient was given: IVF bolus X2, glucagon 1mg IV. EKG: Wide QRS, RBBB, left posterior fascicle block, bifascicular block. One episode of vomiting with chest pain following glucagon administration. EKG unchanged. Labs notable for WBC 4.27, H/h 10.9/34, BUN/Cr 31/1.4 (baseline unknown), initial troponin <.015. CXR: Small left pleural effusion with basilar atelectasis or consolidation.         Patient was evaluated by cardio consult (Dr. Ceron) in the ED. (11 Aug 2019 11:33)        ---    HOSPITAL COURSE:     Patient admitted for bradycardia. Cardio Dr. London's group was consulted. Patient's home medications of metoprolol and propafenone were held in the setting of hearts rates in the 40s-60s and SBPs . Echo showed EF of 35% with segmental wall motion abnormalities, which is consistent with previous .  Troponin levels were trended and remained negative. Patient's home med of eliquis for anticogulation given history of afib was held and patient instead received lovenox 60subQ daily in anticipation of possible pacemaker placement. However, patient will be discharged on eliquis since there is no indication for PPM inpatient. Patient should be evaluated by electrophysiologist outpatient. While hospitalized, patient developed an elevated wbc and was found to have RUL pneumonia on CXR. Patient was started on IV azithromycin and IV rocephin, as well as baccid, nebulizers and mucinex. Patient to be discharged on ceftin 500mg PO BID for 5 days. CT chest showed small loculated left-sided pleural effusion, inferior lingula and left lower lobe airspace consolidation, and patchy groundglass opacities predominantly in the right upper lung zones. Patient had a modified barium swallow which recommended dysphagia 3 (soft solids) with thin liquids.        Patient was medically optimized and improved clinically throughout hospital course. Patient seen and examined on day of discharge.        Vital Signs Last 24 Hrs    T(C): 36.8 (15 Aug 2019 07:47), Max: 37.5 (15 Aug 2019 00:03)    T(F): 98.2 (15 Aug 2019 07:47), Max: 99.5 (15 Aug 2019 00:03)    HR: 75 (15 Aug 2019 10:00) (60 - 80)    BP: 109/74 (15 Aug 2019 10:00) (95/51 - 124/64)    BP(mean): --    RR: 16 (15 Aug 2019 07:47) (16 - 18)    SpO2: 96% (15 Aug 2019 10:00) (93% - 100%)        GENERAL: no acute distress    HEENT: NC/AT, EOMI, neck supple, moist mucous membranes    RESPIRATORY: presence of diffuse mild wheezing     CARDIOVASCULAR: soft, no murmurs, gallops, rubs    ABDOMINAL: soft s1,s2, non-tender, non-distended, positive bowel sounds     EXTREMITIES: no clubbing, cyanosis, or edema    NEUROLOGICAL: AAO x3, good muscle tone in 4 extremities, no obvious sensory deficits    SKIN: warm, dry, intact    MUSCULOSKELETAL: no gross joint deformity        Patient is medically stable for discharge to home with outpatient follow up.    ---    CONSULTANTS:     Dr. London's group, cardiology    Dr. Bautista, pulmonology         ---    FINAL DISCHARGE DIAGNOSIS LIST:    Please see last daily progress note for final discharge diagnoses            Time spent: 40 minutes

## 2019-08-13 NOTE — PROGRESS NOTE ADULT - ASSESSMENT
85 year old Chinese-speaking female with a past medical history of CHFpEF (last echo 2 months ago normal per family), reported CAD s/p CABG and mitral valve repair and aortic valve replacement in 2015, atrial fibrillation on eliquis,  HLD, and HTN, BIBEMS s/p developing weakness and fatigue, found to be in junctional bradycardia:      Junctional bradycardia  - Patient s/p glucagon in an effort to reverse Toprol  - Her BP is acceptable for now still soft at times  - Continue HOLD propafenone and Toprol.  Hopefully her heart rate will recover as these drugs wash out.  Her HR yesterday was 37.  Today is is mostly high 40s.  She has sinus ann-marie and junctional, alternating.  Hopefully this will continue to improve over the next 24 hours.  - She does have significant conduction system disease on her EKG.  The possibility of permanent PPM was discussed with the family  - Zoll pads to anterior chest wall with back up pacing device at bedside  - Atropine at bedside  - No indication for dopamine or TVP for now  - Needs continuous cardiac monitoring    CAD s/p CAbg w/ AVR  - CE negative x1 continue  serial enzymes and EKGs  - Continue aspirin  - Continue statin drug    PAfib   - Currently not on any AC would cover on full dose lovenox since eliquis is being held  - Propafenone not the best choice of anti arrhythmic drug in a patient with advanced age with structural heart disease. To be discontinued permanently    HFpEF  - CW Lasix  - Check 2D echo    -Monitor and replete lytes, keep K>4 and Mg >2  - Further cardiac workup will depend on clinical course.   - All other workup per primary team  Thank you for the consult  Will continue to follow  Marques Batista [facility]  Cardiology   Spectra #[phone] 85 year old Chinese-speaking female with a past medical history of CHFpEF (last echo 2 months ago normal per family), reported CAD s/p CABG and mitral valve repair and aortic valve replacement in 2015, atrial fibrillation on eliquis,  HLD, and HTN, BIBEMS s/p developing weakness and fatigue, found to be in junctional bradycardia:      Junctional bradycardia  - Patient s/p glucagon in an effort to reverse Toprol  - Her BP is acceptable for now still soft at times  - Continue HOLD propafenone and Toprol.  Hopefully her heart rate will recover as these drugs wash out.  Her HR on admission was 37.  yesterday is is mostly high 40s and today is sustaining in 50's.  She has sinus ann-marie and junctional, alternating.  Hopefully this will continue to improve over the next 24 hours.  We would like to have her ambulate while watching tele to assess her chronotropic competence.   - She does have significant conduction system disease on her EKG.  The possibility of permanent PPM was discussed with the family  - Zoll pads to anterior chest wall with back up pacing device at bedside  - Atropine at bedside  - Needs continuous cardiac monitoring    CAD s/p CAbg w/ AVR  - CE negative x3  - Continue aspirin  - Continue statin drug    PAfib   - Currently not on any AC would cover on full dose lovenox since eliquis is being held  - Propafenone not the best choice of anti arrhythmic drug in a patient with advanced age with structural heart disease. To be discontinued permanently    HFpEF  - CW Lasix  - Check 2D echo pending   - repeat chest xray ordered for today    -Monitor and replete lytes, keep K>4 and Mg >2  - Further cardiac workup will depend on clinical course.   - All other workup per primary team  Thank you for the consult  Will continue to follow  Marques Batista [facility]  Cardiology   Spectra #[phone] 85 year old Chinese-speaking female with a past medical history of CHFpEF (last echo 2 months ago normal per family), reported CAD s/p CABG and mitral valve repair and aortic valve replacement in 2015, atrial fibrillation on eliquis,  HLD, and HTN, BIBEMS s/p developing weakness and fatigue, found to be in junctional bradycardia:       bradycardia/ Low lying atrial rhythm   - Patient s/p glucagon in an effort to reverse Toprol  - Her BP is acceptable for now still soft at times  - Continue HOLD propafenone and Toprol.  Hopefully her heart rate will recover as these drugs wash out.  Her HR on admission was 37.  yesterday is is mostly high 40s and today is sustaining in 50's.  She has sinus ann-marie and junctional, alternating.  Hopefully this will continue to improve over the next 24 hours.  We would like to have her ambulate while watching tele to assess her chronotropic competence.   - She does have significant conduction system disease on her EKG.  The possibility of permanent PPM was discussed with the family  - Zoll pads to anterior chest wall with back up pacing device at bedside  - Atropine at bedside  - Needs continuous cardiac monitoring    CAD s/p CAbg w/ AVR  - CE negative x3  - Continue aspirin  - Continue statin drug    PAfib   - Currently not on any AC would cover on full dose lovenox since eliquis is being held  - Propafenone not the best choice of anti arrhythmic drug in a patient with advanced age with structural heart disease. To be discontinued permanently    HFpEF  - CW Lasix  - Check 2D echo pending   - repeat chest xray ordered for today    -Monitor and replete lytes, keep K>4 and Mg >2  - Further cardiac workup will depend on clinical course.   - All other workup per primary team  Thank you for the consult  Will continue to follow  Marques Batista [facility]  Cardiology   Spectra #[phone] 85 year old Chinese-speaking female with a past medical history of CHFpEF (last echo 2 months ago normal per family), reported CAD s/p CABG and mitral valve repair and aortic valve replacement in 2015, atrial fibrillation on eliquis,  HLD, and HTN, BIBEMS s/p developing weakness and fatigue, found to be in junctional bradycardia:       bradycardia/ Low lying atrial rhythm   - Patient s/p glucagon in an effort to reverse Toprol  - Her BP is acceptable for now still soft at times  - Bradycardia is likely secondary to underlying conduction disease and medications. Continue HOLD propafenone and Toprol. Her HR on admission was 37.  yesterday is is mostly high 40s and today is sustaining in 50's. Tele appear that she has a low lying atrial focus. Check 12 lead EKG. Hopefully this will continue to improve over the next 24 hours.  We would like to have her ambulate while watching tele to assess her chronotropic competence.   - She does have significant conduction system disease on her EKG.  The possibility of permanent PPM was discussed with the family but for now giving improving rates will defer.   - Zoll pads to anterior chest wall with back up pacing device at bedside  - Atropine at bedside  - Needs continuous cardiac monitoring    CAD s/p CAbg w/ AVR  - CE negative x3  - Continue aspirin  - Continue statin drug    PAfib   - Currently not on any AC would cover on full dose lovenox since eliquis is being held  - Propafenone not the best choice of anti arrhythmic drug in a patient with advanced age with structural heart disease. To be discontinued permanently    HFpEF  - CW Lasix  - Check 2D echo pending   - repeat chest xray ordered for today    -Monitor and replete lytes, keep K>4 and Mg >2  - Further cardiac workup will depend on clinical course.   - All other workup per primary team  Thank you for the consult  Will continue to follow  Marques Batista [facility]  Cardiology   Spectra #[phone]

## 2019-08-14 LAB
ANION GAP SERPL CALC-SCNC: 7 MMOL/L — SIGNIFICANT CHANGE UP (ref 5–17)
BASOPHILS # BLD AUTO: 0.04 K/UL — SIGNIFICANT CHANGE UP (ref 0–0.2)
BASOPHILS NFR BLD AUTO: 0.6 % — SIGNIFICANT CHANGE UP (ref 0–2)
BUN SERPL-MCNC: 26 MG/DL — HIGH (ref 7–23)
CALCIUM SERPL-MCNC: 8.5 MG/DL — SIGNIFICANT CHANGE UP (ref 8.5–10.1)
CHLORIDE SERPL-SCNC: 110 MMOL/L — HIGH (ref 96–108)
CO2 SERPL-SCNC: 25 MMOL/L — SIGNIFICANT CHANGE UP (ref 22–31)
CREAT SERPL-MCNC: 0.87 MG/DL — SIGNIFICANT CHANGE UP (ref 0.5–1.3)
EOSINOPHIL # BLD AUTO: 0.34 K/UL — SIGNIFICANT CHANGE UP (ref 0–0.5)
EOSINOPHIL NFR BLD AUTO: 5.2 % — SIGNIFICANT CHANGE UP (ref 0–6)
GLUCOSE SERPL-MCNC: 85 MG/DL — SIGNIFICANT CHANGE UP (ref 70–99)
HCT VFR BLD CALC: 31.5 % — LOW (ref 34.5–45)
HGB BLD-MCNC: 10 G/DL — LOW (ref 11.5–15.5)
IMM GRANULOCYTES NFR BLD AUTO: 0.9 % — SIGNIFICANT CHANGE UP (ref 0–1.5)
LYMPHOCYTES # BLD AUTO: 0.86 K/UL — LOW (ref 1–3.3)
LYMPHOCYTES # BLD AUTO: 13.1 % — SIGNIFICANT CHANGE UP (ref 13–44)
MCHC RBC-ENTMCNC: 27.7 PG — SIGNIFICANT CHANGE UP (ref 27–34)
MCHC RBC-ENTMCNC: 31.7 GM/DL — LOW (ref 32–36)
MCV RBC AUTO: 87.3 FL — SIGNIFICANT CHANGE UP (ref 80–100)
MONOCYTES # BLD AUTO: 1.03 K/UL — HIGH (ref 0–0.9)
MONOCYTES NFR BLD AUTO: 15.7 % — HIGH (ref 2–14)
NEUTROPHILS # BLD AUTO: 4.23 K/UL — SIGNIFICANT CHANGE UP (ref 1.8–7.4)
NEUTROPHILS NFR BLD AUTO: 64.5 % — SIGNIFICANT CHANGE UP (ref 43–77)
NRBC # BLD: 0 /100 WBCS — SIGNIFICANT CHANGE UP (ref 0–0)
PLATELET # BLD AUTO: 135 K/UL — LOW (ref 150–400)
POTASSIUM SERPL-MCNC: 4.3 MMOL/L — SIGNIFICANT CHANGE UP (ref 3.5–5.3)
POTASSIUM SERPL-SCNC: 4.3 MMOL/L — SIGNIFICANT CHANGE UP (ref 3.5–5.3)
RBC # BLD: 3.61 M/UL — LOW (ref 3.8–5.2)
RBC # FLD: 15.3 % — HIGH (ref 10.3–14.5)
SODIUM SERPL-SCNC: 142 MMOL/L — SIGNIFICANT CHANGE UP (ref 135–145)
WBC # BLD: 6.56 K/UL — SIGNIFICANT CHANGE UP (ref 3.8–10.5)
WBC # FLD AUTO: 6.56 K/UL — SIGNIFICANT CHANGE UP (ref 3.8–10.5)

## 2019-08-14 PROCEDURE — 74230 X-RAY XM SWLNG FUNCJ C+: CPT | Mod: 26

## 2019-08-14 PROCEDURE — 99232 SBSQ HOSP IP/OBS MODERATE 35: CPT

## 2019-08-14 PROCEDURE — 99233 SBSQ HOSP IP/OBS HIGH 50: CPT | Mod: GC

## 2019-08-14 RX ORDER — SODIUM CHLORIDE 9 MG/ML
4 INJECTION INTRAMUSCULAR; INTRAVENOUS; SUBCUTANEOUS THREE TIMES A DAY
Refills: 0 | Status: DISCONTINUED | OUTPATIENT
Start: 2019-08-14 | End: 2019-08-15

## 2019-08-14 RX ORDER — ALBUTEROL 90 UG/1
2.5 AEROSOL, METERED ORAL EVERY 8 HOURS
Refills: 0 | Status: DISCONTINUED | OUTPATIENT
Start: 2019-08-14 | End: 2019-08-15

## 2019-08-14 RX ADMIN — AZITHROMYCIN 255 MILLIGRAM(S): 500 TABLET, FILM COATED ORAL at 07:38

## 2019-08-14 RX ADMIN — ENOXAPARIN SODIUM 60 MILLIGRAM(S): 100 INJECTION SUBCUTANEOUS at 11:34

## 2019-08-14 RX ADMIN — ALBUTEROL 2.5 MILLIGRAM(S): 90 AEROSOL, METERED ORAL at 15:42

## 2019-08-14 RX ADMIN — ALBUTEROL 2.5 MILLIGRAM(S): 90 AEROSOL, METERED ORAL at 20:11

## 2019-08-14 RX ADMIN — Medication 1200 MILLIGRAM(S): at 05:48

## 2019-08-14 RX ADMIN — SODIUM CHLORIDE 4 MILLILITER(S): 9 INJECTION INTRAMUSCULAR; INTRAVENOUS; SUBCUTANEOUS at 20:10

## 2019-08-14 RX ADMIN — CEFTRIAXONE 100 MILLIGRAM(S): 500 INJECTION, POWDER, FOR SOLUTION INTRAMUSCULAR; INTRAVENOUS at 09:18

## 2019-08-14 RX ADMIN — Medication 1 TABLET(S): at 17:01

## 2019-08-14 RX ADMIN — Medication 1 TABLET(S): at 11:34

## 2019-08-14 RX ADMIN — Medication 1200 MILLIGRAM(S): at 17:01

## 2019-08-14 RX ADMIN — SIMVASTATIN 20 MILLIGRAM(S): 20 TABLET, FILM COATED ORAL at 21:55

## 2019-08-14 RX ADMIN — Medication 81 MILLIGRAM(S): at 11:34

## 2019-08-14 RX ADMIN — SODIUM CHLORIDE 4 MILLILITER(S): 9 INJECTION INTRAMUSCULAR; INTRAVENOUS; SUBCUTANEOUS at 15:45

## 2019-08-14 RX ADMIN — Medication 1 TABLET(S): at 07:36

## 2019-08-14 NOTE — PROGRESS NOTE ADULT - PROBLEM SELECTOR PLAN 8
IMPROVE VTE Individual Risk Assessment          RISK                                                          Points  [  ] Previous VTE                                                3  [  ] Thrombophilia                                             2  [  ] Lower limb paralysis                                   2        (unable to hold up >15 seconds)    [  ] Current Cancer                                             2         (within 6 months)  [  ] Immobilization > 24 hrs                              1  [  ] ICU/CCU stay > 24 hours                             1  [ x ] Age > 60                                                         1    IMPROVE VTE Score: 1    Patient anticoagulated with eliquis at home but will start lovenox 60 subQ daily as patient may need procedure for PPM.

## 2019-08-14 NOTE — PROGRESS NOTE ADULT - SUBJECTIVE AND OBJECTIVE BOX
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 She was at her functional baseline early this morning, and then was subsequently found by family to be lying on the couch essentially unresponsive with pallor, cyanosis of her lips and foaming at the mouth. EMS was called. After several minutes, family was able to arouse her, and patient reported feeling dizzy and nauseous. As per chart review, EMS found patient  weak appearing, hypotensive and bradycardic, and patient was given 1/2 amp of atropine IV and heart rate improved from mid 30s to mid 40s-50s with subsequent rise in BP. Patient reports that she is still mildy dizzy, weak, and fatigued, but feeling better. She is unsure if she lost consciousness during the episode. She denies chest pain, shortness of breath, diaphoresis, fever, chills, nausea, abdominal pain, recent fall, recent change in medications.    ED vitals: T: 97.9, HR: 46 BP: 108/55 RR: 18 O2 saturation: initially 99 on nonrebreather then 100 on NC 2 L. In the ED patient was given: IVF bolus X2, glucagon 1mg IV. EKG: Wide QRS, RBBB, left posterior fascicle block, bifascicular block. One episode of vomiting with chest pain following glucagon administration. EKG unchanged. Labs notable for WBC 4.27, H/h 10.9/34, BUN/Cr 31/1.4 (baseline unknown), initial troponin <.015. CXR: Small left pleural effusion with basilar atelectasis or consolidation.     Patient was evaluated by cardio consult (Dr. Ceron) in the ED. (11 Aug 2019 11:33)    Interval HPI/Overnight Events: Overnight, patient with HRs 50s-60s continued to have intermittent junctional and ectopic atrial rhythms. Patient was asymptomatic at the time. Patient seen and examined at bedside in the AM. Patient's family at bedside. Initially, patient states she is feeling nauseous after receiving IV azithromycin. However, on a later encounter, patient is doing well and denies nausea, vomiting, diarrhea. SOB, chest pain, cough.     REVIEW OF SYSTEMS:  CONSTITUTIONAL: denies fever, chills, fatigue, weakness  HEENT: denies blurred vision, sore throat  CARDIOVASCULAR: denies chest pain, chest pressure, palpitations  RESPIRATORY: denies SOB, cough   GASTROINTESTINAL: denies nausea, vomiting, diarrhea, abdominal pain  GENITOURINARY: denies dysuria, discharge  NEUROLOGICAL: denies numbness, headache, focal weakness  MUSCULOSKELETAL: denies new joint pain, muscle aches  HEMATOLOGIC: denies gross bleeding, bruising  SKIN: denies new lesions, new rash    VITAL SIGNS:  Vital Signs Last 24 Hrs  T(C): 36.6 (14 Aug 2019 11:29), Max: 37.7 (14 Aug 2019 04:55)  T(F): 97.9 (14 Aug 2019 11:29), Max: 99.8 (14 Aug 2019 04:55)  HR: 60 (14 Aug 2019 11:29) (56 - 63)  BP: 120/63 (14 Aug 2019 11:29) (97/59 - 120/63)  BP(mean): --  RR: 18 (14 Aug 2019 11:29) (16 - 18)  SpO2: 100% (14 Aug 2019 11:29) (93% - 100%)    PHYSICAL EXAM:   GENERAL: no acute distress  HEENT: NC/AT, EOMI, neck supple, MMM  RESPIRATORY: presence of diffuse mild wheezing   CARDIOVASCULAR: RRR, no murmurs, gallops, rubs  ABDOMINAL: soft, non-tender, non-distended, positive bowel sounds   EXTREMITIES: no clubbing, cyanosis, or edema  NEUROLOGICAL: AAO x3, good muscle tone in 4 extremities, no obvious sensory deficits  SKIN: no rashes or lesions   MUSCULOSKELETAL: no gross joint deformity                         10.0   6.56  )-----------( 135      ( 14 Aug 2019 06:41 )             31.5     08-14    142  |  110<H>  |  26<H>  ----------------------------<  85  4.3   |  25  |  0.87    Ca    8.5      14 Aug 2019 06:41  Mg     2.4     08-13      CAPILLARY BLOOD GLUCOSE    MEDICATIONS  (STANDING):  ALBUTerol    0.083% 2.5 milliGRAM(s) Nebulizer every 8 hours  aspirin enteric coated 81 milliGRAM(s) Oral daily  azithromycin  IVPB      azithromycin  IVPB 500 milliGRAM(s) IV Intermittent every 24 hours  cefTRIAXone   IVPB 1000 milliGRAM(s) IV Intermittent every 24 hours  cefTRIAXone   IVPB      enoxaparin Injectable 60 milliGRAM(s) SubCutaneous daily  furosemide    Tablet 40 milliGRAM(s) Oral daily  guaiFENesin ER 1200 milliGRAM(s) Oral every 12 hours  lactobacillus acidophilus 1 Tablet(s) Oral three times a day with meals  simvastatin 20 milliGRAM(s) Oral at bedtime  sodium chloride 3%  Inhalation 4 milliLiter(s) Inhalation three times a day HPI:  85 year old Chinese-speaking female with a past medical history of CHF (last echo 2 months ago normal per family), MI in s/p mitral valve replacement, aortic valve repair in 2015, atrial fibrillation on eliquis,  HLD, and HTN, BIBEMS s/p developing weakness and fatigue at 9:30AM. She was at her functional baseline early this morning, and then was subsequently found by family to be lying on the couch essentially unresponsive with pallor, cyanosis of her lips and foaming at the mouth. EMS was called. After several minutes, family was able to arouse her, and patient reported feeling dizzy and nauseous. As per chart review, EMS found patient  weak appearing, hypotensive and bradycardic, and patient was given 1/2 amp of atropine IV and heart rate improved from mid 30s to mid 40s-50s with subsequent rise in BP. Patient reports that she is still mildy dizzy, weak, and fatigued, but feeling better. She is unsure if she lost consciousness during the episode. She denies chest pain, shortness of breath, diaphoresis, fever, chills, nausea, abdominal pain, recent fall, recent change in medications.    ED vitals: T: 97.9, HR: 46 BP: 108/55 RR: 18 O2 saturation: initially 99 on nonrebreather then 100 on NC 2 L. In the ED patient was given: IVF bolus X2, glucagon 1mg IV. EKG: Wide QRS, RBBB, left posterior fascicle block, bifascicular block. One episode of vomiting with chest pain following glucagon administration. EKG unchanged. Labs notable for WBC 4.27, H/h 10.9/34, BUN/Cr 31/1.4 (baseline unknown), initial troponin <.015. CXR: Small left pleural effusion with basilar atelectasis or consolidation.     Patient was evaluated by cardio consult (Dr. Ceron) in the ED. (11 Aug 2019 11:33)    Interval HPI/Overnight Events: Overnight, patient with HRs 50s-60s continued to have intermittent junctional and ectopic atrial rhythms. Patient was asymptomatic at the time. Patient seen and examined at bedside in the AM. Patient's family at bedside. Initially, patient states she is feeling nauseous after receiving IV azithromycin. However, on a later encounter, patient is doing well and denies nausea, vomiting, diarrhea. SOB, chest pain, cough.     REVIEW OF SYSTEMS:  CONSTITUTIONAL: denies fever, chills, fatigue, weakness  HEENT: denies blurred vision, sore throat  CARDIOVASCULAR: denies chest pain, chest pressure, palpitations  RESPIRATORY: denies SOB, cough   GASTROINTESTINAL: denies nausea, vomiting, diarrhea, abdominal pain  GENITOURINARY: denies dysuria, discharge  NEUROLOGICAL: denies numbness, headache, focal weakness  MUSCULOSKELETAL: denies new joint pain, muscle aches  HEMATOLOGIC: denies gross bleeding, bruising  SKIN: denies new lesions, new rash    VITAL SIGNS:  Vital Signs Last 24 Hrs  T(C): 36.6 (14 Aug 2019 11:29), Max: 37.7 (14 Aug 2019 04:55)  T(F): 97.9 (14 Aug 2019 11:29), Max: 99.8 (14 Aug 2019 04:55)  HR: 60 (14 Aug 2019 11:29) (56 - 63)  BP: 120/63 (14 Aug 2019 11:29) (97/59 - 120/63)  BP(mean): --  RR: 18 (14 Aug 2019 11:29) (16 - 18)  SpO2: 100% (14 Aug 2019 11:29) (93% - 100%)    PHYSICAL EXAM:   GENERAL: no acute distress  HEENT: NC/AT, EOMI, neck supple, MMM  RESPIRATORY: presence of diffuse mild wheezing   CARDIOVASCULAR: RRR, no murmurs, gallops, rubs  ABDOMINAL: soft, non-tender, non-distended, positive bowel sounds   EXTREMITIES: no clubbing, cyanosis, or edema  NEUROLOGICAL: AAO x3, good muscle tone in 4 extremities, no obvious sensory deficits  SKIN: warm, dry, intact  MUSCULOSKELETAL: no gross joint deformity                         10.0   6.56  )-----------( 135      ( 14 Aug 2019 06:41 )             31.5     08-14    142  |  110<H>  |  26<H>  ----------------------------<  85  4.3   |  25  |  0.87    Ca    8.5      14 Aug 2019 06:41  Mg     2.4     08-13      CAPILLARY BLOOD GLUCOSE    MEDICATIONS  (STANDING):  ALBUTerol    0.083% 2.5 milliGRAM(s) Nebulizer every 8 hours  aspirin enteric coated 81 milliGRAM(s) Oral daily  azithromycin  IVPB      azithromycin  IVPB 500 milliGRAM(s) IV Intermittent every 24 hours  cefTRIAXone   IVPB 1000 milliGRAM(s) IV Intermittent every 24 hours  cefTRIAXone   IVPB      enoxaparin Injectable 60 milliGRAM(s) SubCutaneous daily  furosemide    Tablet 40 milliGRAM(s) Oral daily  guaiFENesin ER 1200 milliGRAM(s) Oral every 12 hours  lactobacillus acidophilus 1 Tablet(s) Oral three times a day with meals  simvastatin 20 milliGRAM(s) Oral at bedtime  sodium chloride 3%  Inhalation 4 milliLiter(s) Inhalation three times a day

## 2019-08-14 NOTE — SWALLOW VFSS/MBS ASSESSMENT ADULT - ADDITIONAL RECOMMENDATIONS
This department to continue to follow during this admission as schedule permits. This department to continue to follow during this admission as schedule permits. Continued swallowing therapy is recommended upon discharge from [facility] (rehab vs home care vs [place] outpatient clinic).

## 2019-08-14 NOTE — SWALLOW VFSS/MBS ASSESSMENT ADULT - ORAL PHASE
Delayed oral transit time/Residue in oral cavity/Reduced anterior - posterior transport Reduced anterior - posterior transport/Delayed oral transit time Delayed oral transit time/Reduced anterior - posterior transport

## 2019-08-14 NOTE — SWALLOW VFSS/MBS ASSESSMENT ADULT - RECOMMENDED FEEDING/EATING TECHNIQUES
crush medication (when feasible)/position upright (90 degrees)/allow for swallow between intakes/alternate food with liquid/maintain upright posture during/after eating for 30 mins/oral hygiene/small sips/bites/provide rest periods between swallows

## 2019-08-14 NOTE — SWALLOW VFSS/MBS ASSESSMENT ADULT - RECOMMENDED CONSISTENCY
1. Dysphagia 3 (soft solids) with thin liquids, as tolerated  2. Utilization of a chin tuck posture during deglutition of all thin liquid PO trials  3. Upright positioning during and ~30 min post meals  4. Small bites and small, single cup sips  5. Strict aspiration precautions  6. Strict oral care

## 2019-08-14 NOTE — PROGRESS NOTE ADULT - PROBLEM SELECTOR PLAN 1
- WBC 4.27 on admission, 11.83 on 8/12/19, 6.56 today  - CXR on admission showed small left pleural effusion with bibasilar atelectasis or consolidation.  - Repeat CXR showed new RUL pneumonia  - C/w IV azithromycin 500mg and IV rocephin 1000mg  - C/w baccid  - Duonebs BID ordered PRN for SOB or wheezing  - CT chest: Small loculated left-sided pleural effusion and associated with pleural   thickening, correlate for complex pleural collection or empyema. Inferior lingula and left lower lobe airspace consolidation could reflect chronic atelectasis and/or pneumonia. Patchy groundglass opacities predominantly in the right upper lung zones as discussed which may reflect edema versus infection/pneumonia.  Small to moderate right-sided pleural effusion.  -Patient had s/s eval with modified barium swallow in the AM and was recommended dysphagia 3 (soft solids) with thin liquids, as tolerated.  - Dr. Bautista, pulmonologist consulted, recs appreciated - WBC 4.27 on admission, 11.83 on 8/12/19, 6.56 today  - CXR on admission showed small left pleural effusion with bibasilar atelectasis or consolidation.  - Repeat CXR showed new RUL pneumonia  - C/w IV azithromycin 500mg and IV rocephin 1000mg  - C/w baccid  - Duonebs BID ordered PRN for SOB or wheezing  - CT chest: Small loculated left-sided pleural effusion and associated with pleural   thickening, correlate for complex pleural collection or empyema. Inferior lingula and left lower lobe airspace consolidation could reflect chronic atelectasis and/or pneumonia. Patchy groundglass opacities predominantly in the right upper lung zones as discussed which may reflect edema versus infection/pneumonia. Small to moderate right-sided pleural effusion.  -Patient had s/s eval with modified barium swallow in the AM and was recommended dysphagia 3 (soft solids) with thin liquids, as tolerated.  - Dr. Bautista, pulmonologist consulted, recs appreciated

## 2019-08-14 NOTE — PROGRESS NOTE ADULT - SUBJECTIVE AND OBJECTIVE BOX
[facility] -- Prisca London, Leti, Lottie, Bharat Bauer Savella  Office # [phone]    Follow Up:  Symptomatic junctional bradycardia    Subjective/Observations: Awake and alert, nasal cannula in use.  Unable to speak English but able to say, "no" when asked if she had pain.  Not in any respiratory discomfort    REVIEW OF SYSTEMS: All other review of systems is negative unless indicated above  PAST MEDICAL & SURGICAL HISTORY:  Atrial fibrillation  CHF (congestive heart failure)  HTN (hypertension)  High cholesterol  Arrhythmia  Mitral regurgitation  Pleural effusion  H/O aortic valve repair  S/P mitral valve repair  S/P CABG (coronary artery bypass graft)  No significant past surgical history    MEDICATIONS  (STANDING):  ALBUTerol    0.083% 2.5 milliGRAM(s) Nebulizer every 8 hours  aspirin enteric coated 81 milliGRAM(s) Oral daily  azithromycin  IVPB      azithromycin  IVPB 500 milliGRAM(s) IV Intermittent every 24 hours  cefTRIAXone   IVPB 1000 milliGRAM(s) IV Intermittent every 24 hours  cefTRIAXone   IVPB      enoxaparin Injectable 60 milliGRAM(s) SubCutaneous daily  furosemide    Tablet 40 milliGRAM(s) Oral daily  guaiFENesin ER 1200 milliGRAM(s) Oral every 12 hours  lactobacillus acidophilus 1 Tablet(s) Oral three times a day with meals  simvastatin 20 milliGRAM(s) Oral at bedtime  sodium chloride 3%  Inhalation 4 milliLiter(s) Inhalation three times a day    MEDICATIONS  (PRN):  ALBUTerol/ipratropium for Nebulization. 3 milliLiter(s) Nebulizer two times a day PRN Shortness of Breath and/or Wheezing    Allergies    No Known Allergies    Intolerances      Vital Signs Last 24 Hrs  T(C): 36.8 (14 Aug 2019 07:32), Max: 37.7 (14 Aug 2019 04:55)  T(F): 98.3 (14 Aug 2019 07:32), Max: 99.8 (14 Aug 2019 04:55)  HR: 59 (14 Aug 2019 07:32) (52 - 63)  BP: 112/58 (14 Aug 2019 07:32) (97/59 - 112/58)  BP(mean): --  RR: 17 (14 Aug 2019 07:32) (16 - 18)  SpO2: 93% (14 Aug 2019 07:32) (93% - 99%)  I&O's Summary      PHYSICAL EXAM:  TELE: Intermittent junctional/ectopic atrial escape rhytms  Constitutional: NAD, awake and alert, well-developed  HEENT: Moist Mucous Membranes, Anicteric  Pulmonary: Non-labored, breath sounds are equal bilaterally, No wheezing, rales. + rhonchi bibasally  Cardiovascular: Regular, S1 and S2, No murmurs, rubs, gallops or clicks  Gastrointestinal: Bowel Sounds present, soft, nontender.   Lymph: No peripheral edema. No lymphadenopathy.  Skin: No visible rashes or ulcers.  Psych:  Mood & affect appropriate  LABS: All Labs Reviewed:                        10.0   6.56  )-----------( 135      ( 14 Aug 2019 06:41 )             31.5                         9.6    7.32  )-----------( 126      ( 13 Aug 2019 07:04 )             30.0                         10.4   11.83 )-----------( 137      ( 12 Aug 2019 08:21 )             32.2     14 Aug 2019 06:41    142    |  110    |  26     ----------------------------<  85     4.3     |  25     |  0.87   13 Aug 2019 07:04    142    |  109    |  38     ----------------------------<  85     4.3     |  27     |  1.10   12 Aug 2019 08:21    142    |  110    |  43     ----------------------------<  104    4.6     |  25     |  1.40     Ca    8.5        14 Aug 2019 06:41  Ca    8.4        13 Aug 2019 07:04  Ca    8.1        12 Aug 2019 08:21  Mg     2.4       13 Aug 2019 07:04  Mg     2.3       11 Aug 2019 11:45    TPro  7.1    /  Alb  2.7    /  TBili  0.6    /  DBili  x      /  AST  32     /  ALT  18     /  AlkPhos  95     11 Aug 2019 11:45  CARDIAC MARKERS ( 12 Aug 2019 13:54 )  .036 ng/mL / x     / 135 U/L / x     / 1.1 ng/mL    < from: TTE Echo Doppler w/o Cont (08.13.19 @ 15:05) >     EXAM:  ECHO TTE WO CON COMP W DOPPLR         PROCEDURE DATE:  08/13/2019        INTERPRETATION:  INDICATION: Heart failure    Blood Pressure 98/61    Height 154.9 cm     Weight 61 kg       BSA 1.6 sq   m    Dimensions:    LA 3.9       Normal Values: 2.0 - 4.0 cm    Ao 3.0        Normal Values: 2.0 - 3.8 cm  SEPTUM 1.1       Normal Values: 0.6 - 1.2 cm  PWT 0.6       Normal Values: 0.6 - 1.1 cm  LVIDd 6.2         Normal Values: 3.0 - 5.6 cm  LVIDs 5.4         Normal Values: 1.8 - 4.0 cm      OBSERVATIONS:  Technically difficult study  Mitral Valve: Bioprosthetic mitral valve replacement. Mild mitral   regurgitation  Aortic Valve/Aorta: Calcified trileaflet aortic valve. Mild aortic   stenosis. Moderate AI  Tricuspid Valve: Not well-visualized. There appears to be about moderate   tricuspid regurgitation. Echocardiogram done in February 2018 noted   annuloplasty ring seen in the tricuspid position with mild to moderate TR.  Pulmonic Valve: Moderate PI  Left Atrium: normal  Right Atrium: Not well-visualized  Left Ventricle: Moderate left ventricular systolic dysfunction, estimated   LVEF of 35%. Inferior and inferior septal walls appear to be akinetic   with hypokinesis of the remaining walls. Septal motion is consistent with   previous cardiac surgery. Left ventricle is enlarged  Right Ventricle: Grossly normal size and systolic function.  Pericardium/Pleura: normal, no significant pericardial effusion.  Pulmonary/RV Pressure: estimated PA systolic pressure of 54 mmHg   LV diastolic dysfunction is present    Conclusion:   Technically difficult study  Moderate left ventricular systolic dysfunction, estimated LVEF of 35%.   Inferior and inferior septal walls appear to be akinetic with hypokinesis   of the remaining walls. Septal motion is consistent with previous cardiac   surgery. Left ventricle is enlarged. LV function on previous   echocardiogram from February 2008 notes an ejection fraction of 40%.  Grossly normal RV size and systolic function.    Calcified trileaflet aortic valve. Mild aortic stenosis. Moderate AI  Tricuspid valve is not well-visualized. There appears to be about   moderate tricuspid regurgitation. Echocardiogram done in February 2018   noted annuloplasty ring seen in the tricuspid position with mild to   moderate TR.  Estimated PA systolic pressure of 54 mmHg.   No significant pericardial effusion.      GAEL BAH   This document has been electronically signed. Aug 14 2019  9:37AM     < end of copied text >    < from: CT Chest No Cont (08.13.19 @ 13:24) >    EXAM:  CT CHEST                            PROCEDURE DATE:  08/13/2019          INTERPRETATION:  CLINICAL INFORMATION: Shortness of breath. History of   CHF of bradycardia. Now with bloody sputum    COMPARISON: Chest radiograph 8/13/2019 and CT scan chest 6/29/2015.    PROCEDURE:   CT of the Chest was performed without intravenous contrast.  Sagittal and coronal reformats were performed.      FINDINGS:    LUNGS AND AIRWAYS:   PLEURA:   There is a small, loculated left-sided pleural effusion associated with   pleural thickening. Finding could represent complicated pleural   collection/empyema in the appropriate clinical setting.  There is underlying airspace consolidation left lower lobe, which may   reflect chronic atelectasis and/or pneumonia.  There is peripheral consolidation in the inferior aspect left lingula   lobe, which could reflect atelectasis and/or pneumonia.  There is linear subpleural scarring or fibrosis left upper lobe.    There is a small to moderate right-sided pleuraleffusion with underlying   compressive atelectasis.  There are patchy groundglass opacities right lung apex, posteriorly   segment right upper lobe and superior segment right lower lobe, findings   which could reflect pulmonary edema or infection/pneumonia.    There is mucous plugging left mainstem bronchus. The central airways   remain patent.    MEDIASTINUM AND ENMANUEL: Clusters of shotty pretracheal mediastinal lymph   nodes, stable in appearance.  Evaluation the pulmonary hilum is limited without intravenous contrast.    VESSELS: Atherosclerotic calcification of the thoracic aorta with mild   ectasia of the ascending aorta measuring up to 3.6 cm.  Coronary artery calcifications.  Prominence of the main pulmonary arterial trunk; finding couldbe   associated with pulmonary arterial hypertension.    HEART: Cardiomegaly. Mitral valve replacement.  Aortic valve repair. No pericardial effusion.    CHEST WALL AND LOWER NECK: Median sternotomy.    VISUALIZED UPPER ABDOMEN: Calcification right upper quadrant could   represent calcified gallstone, not adequately evaluated on this exam.    BONES: No acute osseous abnormality.    IMPRESSION:     Small loculated left-sided pleural effusion and associated with pleural   thickening, correlate for complex pleural collection or empyema.     Inferior lingula and left lower lobe airspace consolidation could reflect   chronic atelectasis and/or pneumonia.    Patchy groundglass opacities predominantly in the right upper lung zones   as discussed which may reflect edema versus infection/pneumonia.  Small to moderate right-sided pleural effusion.    Other findings as discussed above.    REILLY CEDILLO M.D., ATTENDING RADIOLOGIST  This document has been electronically signed. Aug 13 2019  1:52PM     < end of copied text >    < from: 12 Lead ECG (08.11.19 @ 13:08) >    Ventricular Rate 43 BPM    Atrial Rate 42 BPM    QRS Duration 158 ms    Q-T Interval 634 ms    QTC Calculation(Bezet) 535 ms    R Axis 135 degrees    T Axis 2 degrees    Diagnosis Line Junctional bradycardia  Right bundle branch block  Left posterior fascicular block  *** Bifascicular block ***    Confirmed by EL BAUER ([id number]) on 8/12/2019 11:14:23 AM    < end of copied text >

## 2019-08-14 NOTE — PROGRESS NOTE ADULT - PROBLEM SELECTOR PLAN 2
- Bradycardia likely 2/2 beta blocker and antiarrhythmic  - S/p 1/2 amp atropine w/ rise in HR from mids 30s to 40s-50s. Will hold off on atropine for now.  - Hold home metoprolol and propafenone  - S/p Glucagon 1mg IV once for beta blocker reversal  - Continuous monitoring on telemetry   - Echo ordered, F/U  - Zoll pads to anterior chest wall with back up pacing device at bedside  - No indication for dopamine or TVP for now  - May need PPM  - Cardiology consulted (Dr. Ceron)
- Bradycardia likely 2/2 beta blocker and antiarrhythmic  - S/p 1/2 amp atropine w/ rise in HR from mids 30s to 40s-50s. Will hold off on atropine for now.  - Hold home metoprolol and propafenone  - S/p Glucagon 1mg IV once for beta blocker reversal  - Continuous monitoring on telemetry   - Echo: TTE showed EF of 35% (was previously 40%) with SWMA which is consistent with previous CABG  - Zoll pads to anterior chest wall with back up pacing device at bedside  - No indication for dopamine or TVP for now  - May need PPM  - Cardio would like to monitor 1 for more day given intermittent junctional and ectopic atrial rhythms  - Cardiology consulted (Dr. Ceron)
- S/p  1.5L NS boluses  - CKMB <1, CPK <1.8  - Troponin was <.015 -> .037  - Troponin #3 ordered for 2pm, F/U to r/o ACS  - Continuous monitoring on telemetry

## 2019-08-14 NOTE — SWALLOW VFSS/MBS ASSESSMENT ADULT - DIAGNOSTIC IMPRESSIONS
1. The patient demonstrated a mild oral dysphagia for puree, solid, nectar thick, and thin liquid textures marked by delayed bolus collection, transfer, and AP transit time. Trace lingual and palatal residue noted subsequent to deglutition of solids which cleared with a liquid wash.  2. The patient demonstrated a mild pharyngeal dysphagia for puree, solid, and nectar thick liquids marked by a timely pharyngeal swallow trigger with reduced base of tongue retraction, reduced epiglottic deflection, reduced hyolaryngeal elevation, and reduced pharyngeal contractility. Trace stasis noted along the base of tongue, in the vallecula, along the posterior pharyngeal wall, and in the pyriforms which reduced with a subsequent swallow. No laryngeal penetration/aspiration observed.  3. The patient demonstrated a moderate pharyngeal dysphagia for thin liquids marked by a timely pharyngeal swallow trigger with reduced base of tongue retraction, reduced epiglottic deflection, reduced hyolaryngeal elevation, reduced pharyngeal contractility, and incomplete closure of the laryngeal vestibule resulting in laryngeal penetration without full retrieval. Compensatory strategies (i.e. chin tuck) were successful in providing adequate airway protection. One instance of laryngeal penetration with full retrieval was observed on a larger cup sip of thin liquids with incorporation of a chin tuck posture, which was not reduplicated on a small, single cup sips with utilization of a chin tuck posture. Trace stasis noted along the base of tongue, in the vallecula, along the posterior pharyngeal wall, and in the pyriforms which reduced with a subsequent swallow. 1. The patient demonstrated a mild oral dysphagia for puree, solid, nectar thick, and thin liquid textures marked by delayed bolus collection, transfer, and AP transit time. Trace lingual and palatal residue noted subsequent to deglutition of solids which cleared with a liquid wash.  2. The patient demonstrated a mild pharyngeal dysphagia for puree, solid, and nectar thick liquids marked by a timely pharyngeal swallow trigger with reduced base of tongue retraction, reduced epiglottic deflection, reduced hyolaryngeal elevation, and reduced pharyngeal contractility. Trace stasis noted along the base of tongue, in the vallecula, along the posterior pharyngeal wall, and in the pyriforms which reduced with a subsequent swallow. No laryngeal penetration/aspiration observed.  3. The patient demonstrated a moderate pharyngeal dysphagia for thin liquids marked by a timely pharyngeal swallow trigger with reduced base of tongue retraction, reduced epiglottic deflection, reduced hyolaryngeal elevation, reduced pharyngeal contractility, and incomplete closure of the laryngeal vestibule resulting in laryngeal penetration without full retrieval. Compensatory strategies (i.e. chin tuck) were successful in providing adequate airway protection. One instance of laryngeal penetration with full retrieval was observed on a larger cup sip of thin liquids with incorporation of a chin tuck posture, which was not reduplicated on small, single cup sips with utilization of a chin tuck posture. Trace stasis noted along the base of tongue, in the vallecula, along the posterior pharyngeal wall, and in the pyriforms which reduced with a subsequent swallow.

## 2019-08-14 NOTE — PROGRESS NOTE ADULT - PROBLEM SELECTOR PLAN 1
pna, ct chest reviewed, CHF - on lasix  on Dual ABX regimen  effusions - on ct chest - reviewed - no need for intervention at present  nebs for mucociliary clearance and mucus plugging noted on CT chest  cont cardiac assessment and [facility]vs rx regimen optimization  will follow  keep HOB elev, out of bed -   assist with ADL

## 2019-08-14 NOTE — PROGRESS NOTE ADULT - NSHPATTENDINGPLANDISCUSS_GEN_ALL_CORE
patient via family member at bedside translating, re: antibiotics, anticipated discharge tomorrow if stable from cardiac standpoint
pt, rn, sw, cm, residency team, speech language pathology, family @ bedside - re: tx plan, disposition planning
pt, fmaily, cardio, residency team, rn, sw, cm - re: tx plan, dispo planning

## 2019-08-14 NOTE — SWALLOW VFSS/MBS ASSESSMENT ADULT - COMMENTS
The patient was received in the radiology suite this AM, at which time she was alert and cooperative. Patient's primary speaking language is Cantonese, therefore, interpretation services via  phone were utilized ([email or web address] ID#[id number]). The patient was initially seen by this department on 8/13/19 for a bedside swallow evaluation (please see full report for details), at which time a regular solid with thin liquid diet and a Modified Barium Swallow Study is recommended.    Per charting, the patient  is an "85 year old female with a past medical history of CHF (last echo 2 months ago normal per family), MI in s/p mitral valve replacement, aortic valve repair in 2015, atrial fibrillation on eliquis,  HLD, and HTN admitted with pre-syncopal episode and symptomatic bradycardia with hypotension likely 2/2 to medications." Discussed results and recommendations from this evaluation with the patient and call out to MD.

## 2019-08-14 NOTE — PROGRESS NOTE ADULT - ASSESSMENT
85 year old Chinese-speaking female with a past medical history of CHFpEF (last echo 2 months ago normal per family), reported CAD s/p CABG and mitral valve repair and aortic valve replacement in 2015, atrial fibrillation on eliquis,  HLD, and HTN, BIBEMS s/p developing weakness and fatigue, found to be in junctional bradycardia:    Bradycardia/ Low lying atrial rhythm  - Telemetry shows improved rate at 50's to 60's.  She displays intermittent junctional and ectopic atrial rhythm.  She is asymptomatic  - She was on Toprol s/p glucagon in an effort to reverse BB effect  - Continue to hold BB and other AVN blocker or anti-arrhythmics  - Bradycardia is likely secondary to underlying conduction disease and medications. Continue HOLD propafenone and Toprol. Her HR on admission was 37.  Tele appear that she has a low lying atrial focus.  Her HR has been improving; overnight, her lowest was 50's.    - Please, ambulate her with assistance while monitoring tele to assess her chronotropic competence.   - She does have significant conduction system disease on her EKG.  The possibility of permanent PPM was discussed with the family but for now, given improving rates, will defer.   - Zoll pads to anterior chest wall with back up pacing device at bedside  - Atropine at bedside  - Needs continuous cardiac monitoring    CAD s/p CAbg w/ AVR  - CE negative x3  - Continue aspirin  - Continue statin drug  - Her repeat TTE showed EF of 35% (was previously 40%) with SWMA which is consistent with previous CABG  - Start HF meds when BP improves with exception of BB in setting of bradycardia    PAfib   - Currently not on any AC would cover on full dose lovenox since Eliquis is being held  - Propafenone not the best choice of anti arrhythmic drug in a patient with advanced age with structural heart disease. To be discontinued permanently  -Monitor and replete lytes, keep K>4 and Mg >2    HFrEF  - Previous TTE showed EF 40%, repeat here showed 35%  - No evidence of significant volume overload  - Her CT chest is consistent of Pna (LLL and RUL), small to moderate right pleural effusion and small left pleural effusion  - Pulm following.  No plan for thoracentesis at this time  - Continue Lasix  - Strict I&O's    CAP  - Abx per Primary  - Bronchodilators per Pulm    HLD  - Continue statin    DVT Ppx  - On Lovenox full dose    Further cardiac workup will depend on clinical course.   All other workup per primary team  Will continue to follow    Tamera Kennedy [facility]  Cardiology   Spectra #[phone]

## 2019-08-14 NOTE — PHYSICAL THERAPY INITIAL EVALUATION ADULT - ORIENTATION, REHAB EVAL
oriented to person, place, time and situation/Pt speaks limited English, daughter present to translate.

## 2019-08-14 NOTE — PROVIDER CONTACT NOTE (OTHER) - ACTION/TREATMENT ORDERED:
Pt is in bed Myra cardiologist NP made aware She looked at the lab levels continue to monitor the pt
MD Poole notified. Will give insulin as ordered. Continue to monitor.

## 2019-08-14 NOTE — PHYSICAL THERAPY INITIAL EVALUATION ADULT - PERTINENT HX OF CURRENT PROBLEM, REHAB EVAL
Pt presented to ED after found by family to be lying on the couch essentially unresponsive with pallor, cyanosis of her lips and foaming at the mouth. EMS was called. After several minutes, family was able to arouse her, and patient reported feeling dizzy and nauseous.

## 2019-08-14 NOTE — PROGRESS NOTE ADULT - SUBJECTIVE AND OBJECTIVE BOX
Date/Time Patient Seen:  		  Referring MD:   Data Reviewed	       Patient is a 85y old  Female who presents with a chief complaint of Bradycardia (13 Aug 2019 23:15)      Subjective/HPI     PAST MEDICAL & SURGICAL HISTORY:  Atrial fibrillation  CHF (congestive heart failure)  HTN (hypertension)  High cholesterol  Arrhythmia  Mitral regurgitation  Pleural effusion  H/O aortic valve repair  S/P mitral valve repair  S/P CABG (coronary artery bypass graft)  No significant past surgical history        Medication list         MEDICATIONS  (STANDING):  aspirin enteric coated 81 milliGRAM(s) Oral daily  azithromycin  IVPB      azithromycin  IVPB 500 milliGRAM(s) IV Intermittent every 24 hours  cefTRIAXone   IVPB 1000 milliGRAM(s) IV Intermittent every 24 hours  cefTRIAXone   IVPB      enoxaparin Injectable 60 milliGRAM(s) SubCutaneous daily  furosemide    Tablet 40 milliGRAM(s) Oral daily  guaiFENesin ER 1200 milliGRAM(s) Oral every 12 hours  lactobacillus acidophilus 1 Tablet(s) Oral three times a day with meals  simvastatin 20 milliGRAM(s) Oral at bedtime    MEDICATIONS  (PRN):  ALBUTerol/ipratropium for Nebulization. 3 milliLiter(s) Nebulizer two times a day PRN Shortness of Breath and/or Wheezing         Vitals log        ICU Vital Signs Last 24 Hrs  T(C): 36.8 (14 Aug 2019 07:32), Max: 37.7 (14 Aug 2019 04:55)  T(F): 98.3 (14 Aug 2019 07:32), Max: 99.8 (14 Aug 2019 04:55)  HR: 59 (14 Aug 2019 07:32) (52 - 63)  BP: 112/58 (14 Aug 2019 07:32) (97/59 - 112/58)  BP(mean): --  ABP: --  ABP(mean): --  RR: 17 (14 Aug 2019 07:32) (16 - 18)  SpO2: 93% (14 Aug 2019 07:32) (93% - 99%)           Input and Output:  I&O's Detail      Lab Data                        10.0   6.56  )-----------( 135      ( 14 Aug 2019 06:41 )             31.5     08-14    142  |  110<H>  |  26<H>  ----------------------------<  85  4.3   |  25  |  0.87    Ca    8.5      14 Aug 2019 06:41  Mg     2.4     08-13        CARDIAC MARKERS ( 12 Aug 2019 13:54 )  .036 ng/mL / x     / 135 U/L / x     / 1.1 ng/mL        Review of Systems	      Objective     Physical Examination    heart s1s2  lung dec BS  abd soft      Pertinent Lab findings & Imaging      Rico:  NO   Adequate UO     I&O's Detail           Discussed with:     Cultures:	        Radiology

## 2019-08-15 ENCOUNTER — TRANSCRIPTION ENCOUNTER (OUTPATIENT)
Age: 84
End: 2019-08-15

## 2019-08-15 VITALS — OXYGEN SATURATION: 99 %

## 2019-08-15 LAB
ANION GAP SERPL CALC-SCNC: 8 MMOL/L — SIGNIFICANT CHANGE UP (ref 5–17)
BASOPHILS # BLD AUTO: 0.05 K/UL — SIGNIFICANT CHANGE UP (ref 0–0.2)
BASOPHILS NFR BLD AUTO: 0.8 % — SIGNIFICANT CHANGE UP (ref 0–2)
BUN SERPL-MCNC: 19 MG/DL — SIGNIFICANT CHANGE UP (ref 7–23)
CALCIUM SERPL-MCNC: 8.2 MG/DL — LOW (ref 8.5–10.1)
CHLORIDE SERPL-SCNC: 109 MMOL/L — HIGH (ref 96–108)
CO2 SERPL-SCNC: 26 MMOL/L — SIGNIFICANT CHANGE UP (ref 22–31)
CREAT SERPL-MCNC: 0.91 MG/DL — SIGNIFICANT CHANGE UP (ref 0.5–1.3)
EOSINOPHIL # BLD AUTO: 0.18 K/UL — SIGNIFICANT CHANGE UP (ref 0–0.5)
EOSINOPHIL NFR BLD AUTO: 3.1 % — SIGNIFICANT CHANGE UP (ref 0–6)
GLUCOSE SERPL-MCNC: 90 MG/DL — SIGNIFICANT CHANGE UP (ref 70–99)
HCT VFR BLD CALC: 30.8 % — LOW (ref 34.5–45)
HGB BLD-MCNC: 9.9 G/DL — LOW (ref 11.5–15.5)
IMM GRANULOCYTES NFR BLD AUTO: 1.2 % — SIGNIFICANT CHANGE UP (ref 0–1.5)
LYMPHOCYTES # BLD AUTO: 1.04 K/UL — SIGNIFICANT CHANGE UP (ref 1–3.3)
LYMPHOCYTES # BLD AUTO: 17.6 % — SIGNIFICANT CHANGE UP (ref 13–44)
MCHC RBC-ENTMCNC: 28.2 PG — SIGNIFICANT CHANGE UP (ref 27–34)
MCHC RBC-ENTMCNC: 32.1 GM/DL — SIGNIFICANT CHANGE UP (ref 32–36)
MCV RBC AUTO: 87.7 FL — SIGNIFICANT CHANGE UP (ref 80–100)
MONOCYTES # BLD AUTO: 0.92 K/UL — HIGH (ref 0–0.9)
MONOCYTES NFR BLD AUTO: 15.6 % — HIGH (ref 2–14)
NEUTROPHILS # BLD AUTO: 3.64 K/UL — SIGNIFICANT CHANGE UP (ref 1.8–7.4)
NEUTROPHILS NFR BLD AUTO: 61.7 % — SIGNIFICANT CHANGE UP (ref 43–77)
NRBC # BLD: 0 /100 WBCS — SIGNIFICANT CHANGE UP (ref 0–0)
PLATELET # BLD AUTO: 153 K/UL — SIGNIFICANT CHANGE UP (ref 150–400)
POTASSIUM SERPL-MCNC: 4.3 MMOL/L — SIGNIFICANT CHANGE UP (ref 3.5–5.3)
POTASSIUM SERPL-SCNC: 4.3 MMOL/L — SIGNIFICANT CHANGE UP (ref 3.5–5.3)
RBC # BLD: 3.51 M/UL — LOW (ref 3.8–5.2)
RBC # FLD: 15.2 % — HIGH (ref 10.3–14.5)
SODIUM SERPL-SCNC: 143 MMOL/L — SIGNIFICANT CHANGE UP (ref 135–145)
WBC # BLD: 5.9 K/UL — SIGNIFICANT CHANGE UP (ref 3.8–10.5)
WBC # FLD AUTO: 5.9 K/UL — SIGNIFICANT CHANGE UP (ref 3.8–10.5)

## 2019-08-15 PROCEDURE — 99232 SBSQ HOSP IP/OBS MODERATE 35: CPT

## 2019-08-15 PROCEDURE — 99239 HOSP IP/OBS DSCHRG MGMT >30: CPT

## 2019-08-15 RX ORDER — POTASSIUM CHLORIDE 20 MEQ
0 PACKET (EA) ORAL
Qty: 0 | Refills: 0 | DISCHARGE

## 2019-08-15 RX ORDER — LACTOBACILLUS ACIDOPHILUS 100MM CELL
1 CAPSULE ORAL
Qty: 42 | Refills: 0
Start: 2019-08-15 | End: 2019-08-28

## 2019-08-15 RX ORDER — CEFUROXIME AXETIL 250 MG
1 TABLET ORAL
Qty: 10 | Refills: 0
Start: 2019-08-15 | End: 2019-08-19

## 2019-08-15 RX ORDER — ASPIRIN/CALCIUM CARB/MAGNESIUM 324 MG
1 TABLET ORAL
Qty: 0 | Refills: 0 | DISCHARGE
Start: 2019-08-15

## 2019-08-15 RX ORDER — METOPROLOL TARTRATE 50 MG
25 TABLET ORAL
Qty: 0 | Refills: 0 | DISCHARGE

## 2019-08-15 RX ORDER — PROPAFENONE HCL 150 MG
0 TABLET ORAL
Qty: 0 | Refills: 0 | DISCHARGE

## 2019-08-15 RX ORDER — ASPIRIN/CALCIUM CARB/MAGNESIUM 324 MG
1 TABLET ORAL
Qty: 0 | Refills: 0 | DISCHARGE

## 2019-08-15 RX ADMIN — CEFTRIAXONE 100 MILLIGRAM(S): 500 INJECTION, POWDER, FOR SOLUTION INTRAMUSCULAR; INTRAVENOUS at 08:18

## 2019-08-15 RX ADMIN — ENOXAPARIN SODIUM 60 MILLIGRAM(S): 100 INJECTION SUBCUTANEOUS at 12:35

## 2019-08-15 RX ADMIN — SODIUM CHLORIDE 4 MILLILITER(S): 9 INJECTION INTRAMUSCULAR; INTRAVENOUS; SUBCUTANEOUS at 13:52

## 2019-08-15 RX ADMIN — Medication 1200 MILLIGRAM(S): at 05:34

## 2019-08-15 RX ADMIN — AZITHROMYCIN 255 MILLIGRAM(S): 500 TABLET, FILM COATED ORAL at 08:18

## 2019-08-15 RX ADMIN — Medication 1 TABLET(S): at 08:17

## 2019-08-15 RX ADMIN — Medication 1 TABLET(S): at 12:35

## 2019-08-15 RX ADMIN — Medication 81 MILLIGRAM(S): at 12:35

## 2019-08-15 RX ADMIN — SODIUM CHLORIDE 4 MILLILITER(S): 9 INJECTION INTRAMUSCULAR; INTRAVENOUS; SUBCUTANEOUS at 08:11

## 2019-08-15 RX ADMIN — ALBUTEROL 2.5 MILLIGRAM(S): 90 AEROSOL, METERED ORAL at 08:11

## 2019-08-15 NOTE — PROGRESS NOTE ADULT - PROVIDER SPECIALTY LIST ADULT
Cardiology
Hospitalist
Hospitalist
Pulmonology
Pulmonology
Hospitalist

## 2019-08-15 NOTE — PROGRESS NOTE ADULT - REASON FOR ADMISSION
Bradycardia

## 2019-08-15 NOTE — PROGRESS NOTE ADULT - ASSESSMENT
85 year old Chinese-speaking female with a past medical history of CHFpEF (last echo 2 months ago normal per family), reported CAD s/p CABG and mitral valve repair and aortic valve replacement in 2015, atrial fibrillation on eliquis,  HLD, and HTN, BIBEMS s/p developing weakness and fatigue, found to be in junctional bradycardia:    Bradycardia/ Low lying atrial rhythm  - Telemetry shows improved rate at 60's to 70's. With chronotropic competence when ambulating  .  She is asymptomatic  - She was on Toprol s/p glucagon in an effort to reverse BB effect  - Continue to hold BB and other AVN blocker or anti-arrhythmics  - Bradycardia is likely secondary to underlying conduction disease and medications. Continue HOLD propafenone and Toprol. Her HR on admission was 37.  Tele appear that she has a low lying atrial focus.  Her HR has been improving; overnight, her lowest was 50's.    - She does have significant conduction system disease on her EKG.  The possibility of permanent PPM was discussed with the family but for now, given improving rates, will defer.   - Needs continuous cardiac monitorine    CAD s/p CAbg w/ AVR  - CE negative x3  - Continue aspirin  - Continue statin drug  - Her repeat TTE showed EF of 35% (was previously 40%) with SWMA which is consistent with previous CABG  - Start HF meds when BP improves with exception of BB in setting of bradycardia, this can be done on an outpt basis    PAfib   - Eliquis can be resumed since no indication for PPM as inpt   - Propafenone not the best choice of anti arrhythmic drug in a patient with advanced age with structural heart disease. To be discontinued permanently  -Monitor and replete lytes, keep K>4 and Mg >2    HFrEF  - Previous TTE showed EF 40%, repeat here showed 35%  - No evidence of significant volume overload  - Her CT chest is consistent of Pna (LLL and RUL), small to moderate right pleural effusion and small left pleural effusion  - Pulm following.  No plan for thoracentesis at this time  - Continue Lasix  - Strict I&O's    CAP  - Abx per Primary  - Bronchodilators per Pulm    HLD  - Continue statin    DVT Ppx  - On Lovenox full dose    -From a cardiac standpoint the patient may be discharged home to follow up for further EP workup  - To follow up w/ cardiology in one week     Further cardiac workup will depend on clinical course.   All other workup per primary team  Will continue to follow    Marques Batista [facility]  Cardiology   Spectra #[phone] 85 year old Chinese-speaking female with a past medical history of CHFpEF (last echo 2 months ago normal per family), reported CAD s/p CABG and mitral valve repair and aortic valve replacement in 2015, atrial fibrillation on eliquis,  HLD, and HTN, BIBEMS s/p developing weakness and fatigue, found to be in junctional bradycardia:    Bradycardia/ Low lying atrial rhythm  - Telemetry shows improved rate at 60's to 70's. With chronotropic competence when ambulating.  She is asymptomatic  - She was on Toprol s/p glucagon in an effort to reverse BB effect  - Continue to hold BB and other AVN blocker or anti-arrhythmics  - Bradycardia is likely secondary to underlying conduction disease and medications. Continue HOLD propafenone and Toprol. Her HR on admission was 37.  Tele appear that she has a low lying atrial focus.  Her HR has been improving; overnight, her lowest was 50's.    - She does have significant conduction system disease on her EKG.  The possibility of permanent PPM was discussed with the family but for now, given improving rates, will defer.   - Needs continuous cardiac monitoring while admitted    CAD s/p CAbg w/ AVR  - CE negative x 3  - Continue aspirin  - Continue statin drug  - Her repeat TTE showed EF of 35% (was previously 40%) with SWMA which is consistent with previous CABG  - Start HF meds when BP improves with exception of BB in setting of bradycardia, this can be done on an outpt basis    PAfib   - Eliquis can be resumed since no indication for PPM as inpt   - Propafenone not the best choice of anti arrhythmic drug in a patient with advanced age with structural heart disease. To be discontinued permanently  - Monitor and replete lytes, keep K>4 and Mg >2    HFrEF  - Previous TTE showed EF 40%, repeat here showed 35%  - No evidence of significant volume overload  - Her CT chest is consistent of Pna (LLL and RUL), small to moderate right pleural effusion and small left pleural effusion  - Pulm following.  No plan for thoracentesis at this time  - Continue Lasix  - Strict I&O's    CAP  - Abx per Primary  - Bronchodilators per Pulm    HLD  - Continue statin    DVT Ppx  - On Lovenox full dose    -From a cardiac standpoint the patient may be discharged home to follow up for further EP workup  - To follow up w/ cardiology in one week     Further cardiac workup will depend on clinical course.   All other workup per primary team  Will continue to follow    Marques Batista [facility]  Cardiology   Spectra #[phone]

## 2019-08-15 NOTE — DISCHARGE NOTE NURSING/CASE MANAGEMENT/SOCIAL WORK - NSDCDPATPORTLINK_GEN_ALL_CORE
You can access the [facility], offered by [facility], by registering with the following website: [email or web address]

## 2019-08-15 NOTE — PROGRESS NOTE ADULT - PROBLEM SELECTOR PLAN 1
MBS noted - dysphagia 3 rec.   on dual ABX regimen for PNA, likely asp pna   HOB elev  asp prec  oral hygiene  cvs rx regimen and BP control  cardio following  pt is on LASIX  monitor labs  replete lytes  assist with ADL  out of bed  check sat on exertion and at rest  will need CXR in 6 - 8 weeks - to eval resolution of PNA  dc planning  prognosis guarded  GOC discussion ongoing  am labs pending  will follow

## 2019-08-15 NOTE — PROGRESS NOTE ADULT - SUBJECTIVE AND OBJECTIVE BOX
[facility] -- Prisca London, Leti, Lottie, Bharat Bauer Savella  Office # [phone]      Follow Up:    Symptomatic  bradycardia    Subjective/Observations:   No events overnight resting comfortably in bed.  No complaints of chest pain, dyspnea, or palpitations reported. No signs of orthopnea or PND. Family at bedside to translate    REVIEW OF SYSTEMS: All other review of systems is negative unless indicated above    PAST MEDICAL & SURGICAL HISTORY:  Atrial fibrillation  CHF (congestive heart failure)  HTN (hypertension)  High cholesterol  Arrhythmia  Mitral regurgitation  Pleural effusion  H/O aortic valve repair  S/P mitral valve repair  S/P CABG (coronary artery bypass graft)  No significant past surgical history      MEDICATIONS  (STANDING):  ALBUTerol    0.083% 2.5 milliGRAM(s) Nebulizer every 8 hours  aspirin enteric coated 81 milliGRAM(s) Oral daily  azithromycin  IVPB      azithromycin  IVPB 500 milliGRAM(s) IV Intermittent every 24 hours  cefTRIAXone   IVPB 1000 milliGRAM(s) IV Intermittent every 24 hours  cefTRIAXone   IVPB      enoxaparin Injectable 60 milliGRAM(s) SubCutaneous daily  furosemide    Tablet 40 milliGRAM(s) Oral daily  guaiFENesin ER 1200 milliGRAM(s) Oral every 12 hours  lactobacillus acidophilus 1 Tablet(s) Oral three times a day with meals  simvastatin 20 milliGRAM(s) Oral at bedtime  sodium chloride 3%  Inhalation 4 milliLiter(s) Inhalation three times a day    MEDICATIONS  (PRN):  ALBUTerol/ipratropium for Nebulization. 3 milliLiter(s) Nebulizer two times a day PRN Shortness of Breath and/or Wheezing      Allergies    No Known Allergies    Intolerances        Vital Signs Last 24 Hrs  T(C): 36.8 (15 Aug 2019 07:47), Max: 37.5 (15 Aug 2019 00:03)  T(F): 98.2 (15 Aug 2019 07:47), Max: 99.5 (15 Aug 2019 00:03)  HR: 62 (15 Aug 2019 08:15) (60 - 80)  BP: 124/64 (15 Aug 2019 07:47) (95/51 - 124/64)  BP(mean): --  RR: 16 (15 Aug 2019 07:47) (16 - 18)  SpO2: 95% (15 Aug 2019 08:15) (93% - 100%)    I&O's Summary        PHYSICAL EXAM:  TELE: LOw lying atrial rythm  Constitutional: NAD, awake and alert, well-developed  HEENT: Moist Mucous Membranes, Anicteric  Pulmonary: Non-labored, breath sounds with crackles bilaterally at bases , No wheezing, or rhonchi   Cardiovascular: Regular, S1 and S2 nl, No murmurs, rubs, gallops or clicks  Gastrointestinal: Bowel Sounds present, soft, nontender.   Lymph: No lymphadenopathy. No peripheral edema.  Skin: No visible rashes or ulcers.  Psych:  Mood & affect appropriate    LABS: All Labs Reviewed:                        9.9    5.90  )-----------( 153      ( 15 Aug 2019 06:19 )             30.8                         10.0   6.56  )-----------( 135      ( 14 Aug 2019 06:41 )             31.5                         9.6    7.32  )-----------( 126      ( 13 Aug 2019 07:04 )             30.0     15 Aug 2019 06:19    143    |  109    |  19     ----------------------------<  90     4.3     |  26     |  0.91   14 Aug 2019 06:41    142    |  110    |  26     ----------------------------<  85     4.3     |  25     |  0.87   13 Aug 2019 07:04    142    |  109    |  38     ----------------------------<  85     4.3     |  27     |  1.10     Ca    8.2        15 Aug 2019 06:19  Ca    8.5        14 Aug 2019 06:41  Ca    8.4        13 Aug 2019 07:04  Mg     2.4       13 Aug 2019 07:04               ECG:  < from: 12 Lead ECG (08.11.19 @ 13:08) >  Ventricular Rate 43 BPM    Atrial Rate 42 BPM    QRS Duration 158 ms    Q-T Interval 634 ms    QTC Calculation(Bezet) 535 ms    R Axis 135 degrees    T Axis 2 degrees    Diagnosis Line Junctional bradycardia  Right bundle branch block  Left posterior fascicular block  *** Bifascicular block ***    Confirmed by EL BAUER ([id number]) on 8/12/2019 11:14:23 AM    < end of copied text >    Echo:  < from: TTE Echo Doppler w/o Cont (08.13.19 @ 15:05) >  EXAM:  ECHO TTE WO CON COMP W DOPPLR         PROCEDURE DATE:  08/13/2019        INTERPRETATION:  INDICATION: Heart failure    Blood Pressure 98/61    Height 154.9 cm     Weight 61 kg       BSA 1.6 sq   m    Dimensions:    LA 3.9       Normal Values: 2.0 - 4.0 cm    Ao 3.0        Normal Values: 2.0 - 3.8 cm  SEPTUM 1.1       Normal Values: 0.6 - 1.2 cm  PWT 0.6       Normal Values: 0.6 - 1.1 cm  LVIDd 6.2         Normal Values: 3.0 - 5.6 cm  LVIDs 5.4         Normal Values: 1.8 - 4.0 cm      OBSERVATIONS:  Technically difficult study  Mitral Valve: Bioprosthetic mitral valve replacement. Mild mitral   regurgitation  Aortic Valve/Aorta: Calcified trileaflet aortic valve. Mild aortic   stenosis. Moderate AI  Tricuspid Valve: Not well-visualized. There appears to be about moderate   tricuspid regurgitation. Echocardiogram done in February 2018 noted   annuloplasty ring seen in the tricuspid position with mild to moderate TR.  Pulmonic Valve: Moderate PI  Left Atrium: normal  Right Atrium: Not well-visualized  Left Ventricle: Moderate left ventricular systolic dysfunction, estimated   LVEF of 35%. Inferior and inferior septal walls appear to be akinetic   with hypokinesis of the remaining walls. Septal motion is consistent with   previous cardiac surgery. Left ventricle is enlarged  Right Ventricle: Grossly normal size and systolic function.  Pericardium/Pleura: normal, no significant pericardial effusion.  Pulmonary/RV Pressure: estimated PA systolic pressure of 54 mmHg   LV diastolic dysfunction is present    Conclusion:   Technically difficult study  Moderate left ventricular systolic dysfunction, estimated LVEF of 35%.   Inferior and inferior septal walls appear to be akinetic with hypokinesis   of the remaining walls. Septal motion is consistent with previous cardiac   surgery. Left ventricle is enlarged. LV function on previous   echocardiogram from February 2008 notes an ejection fraction of 40%.  Grossly normal RV size and systolic function.    Calcified trileaflet aortic valve. Mild aortic stenosis. Moderate AI  Tricuspid valve is not well-visualized. There appears to be about   moderate tricuspid regurgitation. Echocardiogram done in February 2018   noted annuloplasty ring seen in the tricuspid position with mild to   moderate TR.  Estimated PA systolic pressure of 54 mmHg.   No significant pericardial effusion.                  GAEL BAH   This document has been electronically signed. Aug 14 2019  9:37AM                < end of copied text >    Radiology:  < from: CT Chest No Cont (08.13.19 @ 13:24) >  EXAM:  CT CHEST                            PROCEDURE DATE:  08/13/2019          INTERPRETATION:  CLINICAL INFORMATION: Shortness of breath. History of   CHF of bradycardia. Now with bloody sputum    COMPARISON: Chest radiograph 8/13/2019 and CT scan chest 6/29/2015.    PROCEDURE:   CT of the Chest was performed without intravenous contrast.  Sagittal and coronal reformats were performed.      FINDINGS:    LUNGS AND AIRWAYS:   PLEURA:   There is a small, loculated left-sided pleural effusion associated with   pleural thickening. Finding could represent complicated pleural   collection/empyema in the appropriate clinical setting.  There is underlying airspace consolidation left lower lobe, which may   reflect chronic atelectasis and/or pneumonia.  There is peripheral consolidation in the inferior aspect left lingula   lobe, which could reflect atelectasis and/or pneumonia.  There is linear subpleural scarring or fibrosis left upper lobe.    There is a small to moderate right-sided pleuraleffusion with underlying   compressive atelectasis.  There are patchy groundglass opacities right lung apex, posteriorly   segment right upper lobe and superior segment right lower lobe, findings   which could reflect pulmonary edema or infection/pneumonia.    There is mucous plugging left mainstem bronchus. The central airways   remain patent.    MEDIASTINUM AND ENMANUEL: Clusters of shotty pretracheal mediastinal lymph   nodes, stable in appearance.  Evaluation the pulmonary hilum is limited without intravenous contrast.    VESSELS: Atherosclerotic calcification of the thoracic aorta with mild   ectasia of the ascending aorta measuring up to 3.6 cm.  Coronary artery calcifications.  Prominence of the main pulmonary arterial trunk; finding couldbe   associated with pulmonary arterial hypertension.    HEART: Cardiomegaly. Mitral valve replacement.  Aortic valve repair. No pericardial effusion.    CHEST WALL AND LOWER NECK: Median sternotomy.    VISUALIZED UPPER ABDOMEN: Calcification right upper quadrant could   represent calcified gallstone, not adequately evaluated on this exam.    BONES: No acute osseous abnormality.    IMPRESSION:     Small loculated left-sided pleural effusion and associated with pleural   thickening, correlate for complex pleural collection or empyema.     Inferior lingula and left lower lobe airspace consolidation could reflect   chronic atelectasis and/or pneumonia.    Patchy groundglass opacities predominantly in the right upper lung zones   as discussed which may reflect edema versus infection/pneumonia.  Small to moderate right-sided pleural effusion.    Other findings as discussed above.                REILLY CEDILLO M.D., ATTENDING RADIOLOGIST  This document has been electronically signed. Aug 13 2019  1:52PM        < end of copied text >           Marques Batista ANP   Cardiology

## 2019-08-15 NOTE — PROGRESS NOTE ADULT - SUBJECTIVE AND OBJECTIVE BOX
Date/Time Patient Seen:  		  Referring MD:   Data Reviewed	       Patient is a 85y old  Female who presents with a chief complaint of Bradycardia (14 Aug 2019 13:54)      Subjective/HPI     PAST MEDICAL & SURGICAL HISTORY:  Atrial fibrillation  CHF (congestive heart failure)  HTN (hypertension)  High cholesterol  Arrhythmia  Mitral regurgitation  Pleural effusion  H/O aortic valve repair  S/P mitral valve repair  S/P CABG (coronary artery bypass graft)  No significant past surgical history        Medication list         MEDICATIONS  (STANDING):  ALBUTerol    0.083% 2.5 milliGRAM(s) Nebulizer every 8 hours  aspirin enteric coated 81 milliGRAM(s) Oral daily  azithromycin  IVPB      azithromycin  IVPB 500 milliGRAM(s) IV Intermittent every 24 hours  cefTRIAXone   IVPB 1000 milliGRAM(s) IV Intermittent every 24 hours  cefTRIAXone   IVPB      enoxaparin Injectable 60 milliGRAM(s) SubCutaneous daily  furosemide    Tablet 40 milliGRAM(s) Oral daily  guaiFENesin ER 1200 milliGRAM(s) Oral every 12 hours  lactobacillus acidophilus 1 Tablet(s) Oral three times a day with meals  simvastatin 20 milliGRAM(s) Oral at bedtime  sodium chloride 3%  Inhalation 4 milliLiter(s) Inhalation three times a day    MEDICATIONS  (PRN):  ALBUTerol/ipratropium for Nebulization. 3 milliLiter(s) Nebulizer two times a day PRN Shortness of Breath and/or Wheezing         Vitals log        ICU Vital Signs Last 24 Hrs  T(C): 37.4 (15 Aug 2019 04:28), Max: 37.5 (15 Aug 2019 00:03)  T(F): 99.4 (15 Aug 2019 04:28), Max: 99.5 (15 Aug 2019 00:03)  HR: 60 (15 Aug 2019 04:28) (59 - 80)  BP: 100/61 (15 Aug 2019 05:35) (95/51 - 120/63)  BP(mean): --  ABP: --  ABP(mean): --  RR: 18 (15 Aug 2019 04:28) (16 - 18)  SpO2: 94% (15 Aug 2019 04:28) (93% - 100%)           Input and Output:  I&O's Detail      Lab Data                        9.9    5.90  )-----------( 153      ( 15 Aug 2019 06:19 )             30.8     08-15    143  |  109<H>  |  19  ----------------------------<  90  4.3   |  26  |  0.91    Ca    8.2<L>      15 Aug 2019 06:19  Mg     2.4     08-13              Review of Systems	      Objective     Physical Examination    heart s1s2  lung dec BS  abd soft      Pertinent Lab findings & Imaging      Trisha:  NO   Adequate UO     I&O's Detail           Discussed with:     Cultures:	        Radiology

## 2019-08-16 ENCOUNTER — APPOINTMENT (OUTPATIENT)
Dept: CARDIOLOGY | Facility: CLINIC | Age: 84
End: 2019-08-16

## 2019-08-16 PROBLEM — I10 ESSENTIAL (PRIMARY) HYPERTENSION: Chronic | Status: ACTIVE | Noted: 2019-08-11

## 2019-08-16 PROBLEM — E78.00 PURE HYPERCHOLESTEROLEMIA, UNSPECIFIED: Chronic | Status: ACTIVE | Noted: 2019-08-11

## 2019-08-16 PROBLEM — I48.91 UNSPECIFIED ATRIAL FIBRILLATION: Chronic | Status: ACTIVE | Noted: 2019-08-11

## 2019-08-16 PROBLEM — I50.9 HEART FAILURE, UNSPECIFIED: Chronic | Status: ACTIVE | Noted: 2019-08-11

## 2019-08-22 ENCOUNTER — APPOINTMENT (OUTPATIENT)
Dept: CARDIOLOGY | Facility: CLINIC | Age: 84
End: 2019-08-22
Payer: MEDICARE

## 2019-08-22 ENCOUNTER — NON-APPOINTMENT (OUTPATIENT)
Age: 84
End: 2019-08-22

## 2019-08-22 VITALS
TEMPERATURE: 98 F | RESPIRATION RATE: 17 BRPM | WEIGHT: 128 LBS | DIASTOLIC BLOOD PRESSURE: 69 MMHG | BODY MASS INDEX: 24.19 KG/M2 | SYSTOLIC BLOOD PRESSURE: 118 MMHG | OXYGEN SATURATION: 97 % | HEART RATE: 64 BPM

## 2019-08-22 DIAGNOSIS — R55 SYNCOPE AND COLLAPSE: ICD-10-CM

## 2019-08-22 DIAGNOSIS — J90 PLEURAL EFFUSION, NOT ELSEWHERE CLASSIFIED: ICD-10-CM

## 2019-08-22 PROCEDURE — 99214 OFFICE O/P EST MOD 30 MIN: CPT

## 2019-08-22 PROCEDURE — 93000 ELECTROCARDIOGRAM COMPLETE: CPT

## 2019-08-22 NOTE — PHYSICAL EXAM
[General Appearance - Well Developed] : well developed [Normal Appearance] : normal appearance [Well Groomed] : well groomed [General Appearance - Well Nourished] : well nourished [No Deformities] : no deformities [Normal Conjunctiva] : the conjunctiva exhibited no abnormalities [General Appearance - In No Acute Distress] : no acute distress [Eyelids - No Xanthelasma] : the eyelids demonstrated no xanthelasmas [Normal Oral Mucosa] : normal oral mucosa [No Oral Pallor] : no oral pallor [No Oral Cyanosis] : no oral cyanosis [Normal Jugular Venous A Waves Present] : normal jugular venous A waves present [Normal Jugular Venous V Waves Present] : normal jugular venous V waves present [No Jugular Venous Sandoval A Waves] : no jugular venous sandoval A waves [Respiration, Rhythm And Depth] : normal respiratory rhythm and effort [Exaggerated Use Of Accessory Muscles For Inspiration] : no accessory muscle use [Bibasilar Rales/Crackles] : bibasilar rales [Heart Sounds] : normal S1 and S2 [Arterial Pulses Normal] : the arterial pulses were normal [Edema] : no peripheral edema present [Abdomen Soft] : soft [Abdomen Tenderness] : non-tender [Abnormal Walk] : normal gait [Abdomen Mass (___ Cm)] : no abdominal mass palpated [Gait - Sufficient For Exercise Testing] : the gait was sufficient for exercise testing [Cyanosis, Localized] : no localized cyanosis [Nail Clubbing] : no clubbing of the fingernails [Petechial Hemorrhages (___cm)] : no petechial hemorrhages [] : no ischemic changes [Oriented To Time, Place, And Person] : oriented to person, place, and time [Affect] : the affect was normal [Mood] : the mood was normal [No Anxiety] : not feeling anxious [Normal] : the heart rate was normal [FreeTextEntry1] : 2/6 LOLA

## 2019-08-22 NOTE — HISTORY OF PRESENT ILLNESS
[FreeTextEntry1] : 85-year-old female with CAD s/p MI, severe MR s/p bioprosthetic MVR on 8/4/15, and AFIB presents for followup.  Patient was last seen on 2/13/19.  She is on Metoprolol ER 25 mg and Propafenone 150 mg (QD only) for PAF and Eliquis 2.5 mg (QD only) for stroke prevention.  She is on ASA (QOD) and Simvastatin 20 mg (QOD) for CAD.  She takes Lasix and KCL for pleural effusion.  Patient cut down on Eliquis due to bruising.  Patient's recent LDL was 150.  Patient denies CP or SOB.  Patient reports occasional palpitations.

## 2019-08-22 NOTE — DISCUSSION/SUMMARY
[FreeTextEntry1] : 85-year-old female with CAD s/p MI, severe MR s/p bioprosthetic MVR on 8/4/15, and AFIB presents for followup.  Patient was last seen on 2/13/19.  She is on Metoprolol ER 25 mg and Propafenone 150 mg (QD only) for PAF and Eliquis 2.5 mg (QD only) for stroke prevention.  She is on ASA (QOD) and Simvastatin 20 mg (QOD) for CAD.  She takes Lasix and KCL for pleural effusion.  Patient cut down on Eliquis due to bruising.  Patient's recent LDL was 150.  Patient denies CP or SOB.  Patient reports occasional palpitations.  \par \par (1) PAF, palpitations - She should continue Metoprolol ER 25 mg and Propafenone 150 mg QD.  Her [id number] score is 4 (age>75, CAD, female gender).  She should continue Eliquis 2.5 mg BID for stroke prevention. \par \par (2) CAD s/p MI, LV dysfunction, LV enlargement - Patient is stable.  She should continue ASA (reduce to QOD because of easy bruising).  Her recent LDL was 150.  I advised patient to take Simvastatin 20 mg daily instead of QOD.  I will consider adding an ARB in the future.  She underwent an echocardiogram today and it showed moderate LV systolic dysfunction with EF of 35%.  \par \par (3) Pleural effusion, chronic - Patient is stable. I advised her to continue Lasix and KCL.\par \par (4) Severe MR s/p bioprosthetic MVR on 8/4/15 - Patient is stable.  She underwent an echocardiogram and it showed stable bioprosthetic valve function.\par \par (5) Followup - 4 months.

## 2019-08-22 NOTE — PATIENT PROFILE ADULT - ARE SIGNIFICANT INDICATORS COMPLETE.
Visit Vitals  /83 (BP 1 Location: Right arm)   Pulse 73   Temp 97.8 °F (36.6 °C)   Resp 16     LLE Peripheral Vascular   Capillary Refill: Less than/equal to 3 seconds (08/22/19 0839)  Color: Appropriate for race (08/22/19 0839)  Temperature: Warm (08/22/19 0839)  Sensation: Decreased (08/22/19 0839)  Pedal Pulse: Present (08/22/19 0839)  Circumference of Calf (cm): 31 cm (08/22/19 0839)  Location of Measurement (Calf): Mid  (08/22/19 0839)  Circumference of Ankle (cm): 20 cm (08/22/19 0839)  Location of Measurement (Ankle): Upper  (08/22/19 0839) Yes

## 2019-09-03 ENCOUNTER — FORM ENCOUNTER (OUTPATIENT)
Age: 84
End: 2019-09-03

## 2019-09-03 ENCOUNTER — RESULT CHARGE (OUTPATIENT)
Age: 84
End: 2019-09-03

## 2019-09-05 ENCOUNTER — RESULT REVIEW (OUTPATIENT)
Age: 84
End: 2019-09-05

## 2019-09-06 NOTE — ED ADULT TRIAGE NOTE - HEIGHT IN CM
Impression: Dry eye syndrome of bilateral lacrimal glands: H04.123. Plan: Discussed diagnosis in detail with patient. Dry eye accounts for the patient's symptoms. Dry eye is a chronic condition and does not have a cure and will need artificial tears for maintenance. There is no evidence of permanent changes to the cornea. Recommend Systane Balance or Refresh Repair OU QID longterm. Monitor for changes.
Impression: Other secondary cataract, left eye: H26.492. Plan: Discussed diagnosis with patient in detail. No treatment is required at this time. Will continue to observe condition and/or symptoms. Patient instructed to call if condition gets worse.
Impression: Type 2 diabetes mellitus w/o complication: T33.0. OU. Plan: Diabetes type II: no background diabetic retinopathy, no signs of neovascularization noted. Discussed ocular and systemic benefits of blood sugar control. Continue to monitor for changes. Advised patient to RTC immediately if changes to vision are noted.
Impression: Vitreous degeneration, bilateral: H43.813. Plan: Posterior vitreous detachment accounts for the patient's complaints. Discussed signs and symptoms of PVD/floaters. Signs and symptoms of retinal detachment were discussed in detail. There is no evidence of retina pathology. No treatment is required at this time. Patient instructed to call immediately if any signs or symptoms of retinal detachments occur.
154.94

## 2019-09-20 ENCOUNTER — MEDICATION RENEWAL (OUTPATIENT)
Age: 84
End: 2019-09-20

## 2019-09-23 ENCOUNTER — MESSAGE (OUTPATIENT)
Age: 84
End: 2019-09-23

## 2019-10-13 PROCEDURE — 97530 THERAPEUTIC ACTIVITIES: CPT

## 2019-10-13 PROCEDURE — 86901 BLOOD TYPING SEROLOGIC RH(D): CPT

## 2019-10-13 PROCEDURE — 85730 THROMBOPLASTIN TIME PARTIAL: CPT

## 2019-10-13 PROCEDURE — 74230 X-RAY XM SWLNG FUNCJ C+: CPT

## 2019-10-13 PROCEDURE — 71250 CT THORAX DX C-: CPT

## 2019-10-13 PROCEDURE — 71045 X-RAY EXAM CHEST 1 VIEW: CPT

## 2019-10-13 PROCEDURE — 86850 RBC ANTIBODY SCREEN: CPT

## 2019-10-13 PROCEDURE — 82550 ASSAY OF CK (CPK): CPT

## 2019-10-13 PROCEDURE — 96360 HYDRATION IV INFUSION INIT: CPT

## 2019-10-13 PROCEDURE — 85610 PROTHROMBIN TIME: CPT

## 2019-10-13 PROCEDURE — A9698: CPT

## 2019-10-13 PROCEDURE — 80048 BASIC METABOLIC PNL TOTAL CA: CPT

## 2019-10-13 PROCEDURE — 97162 PT EVAL MOD COMPLEX 30 MIN: CPT

## 2019-10-13 PROCEDURE — 80053 COMPREHEN METABOLIC PANEL: CPT

## 2019-10-13 PROCEDURE — 93005 ELECTROCARDIOGRAM TRACING: CPT

## 2019-10-13 PROCEDURE — 97116 GAIT TRAINING THERAPY: CPT

## 2019-10-13 PROCEDURE — 94640 AIRWAY INHALATION TREATMENT: CPT

## 2019-10-13 PROCEDURE — 84484 ASSAY OF TROPONIN QUANT: CPT

## 2019-10-13 PROCEDURE — 93306 TTE W/DOPPLER COMPLETE: CPT

## 2019-10-13 PROCEDURE — 82553 CREATINE MB FRACTION: CPT

## 2019-10-13 PROCEDURE — 36415 COLL VENOUS BLD VENIPUNCTURE: CPT

## 2019-10-13 PROCEDURE — 85027 COMPLETE CBC AUTOMATED: CPT

## 2019-10-13 PROCEDURE — 86900 BLOOD TYPING SEROLOGIC ABO: CPT

## 2019-10-13 PROCEDURE — 99291 CRITICAL CARE FIRST HOUR: CPT | Mod: 25

## 2019-10-13 PROCEDURE — 83735 ASSAY OF MAGNESIUM: CPT

## 2019-10-13 PROCEDURE — 96361 HYDRATE IV INFUSION ADD-ON: CPT

## 2019-11-12 NOTE — HISTORY OF PRESENT ILLNESS
[FreeTextEntry1] : 85-year-old female with CAD s/p MI, severe MR s/p bioprosthetic MVR on 8/4/15, and AFIB presents for followup.  Patient was last seen on 6/12/19.  Patient had a syncopal episode and was rushed to [facility] on 8/11.  In the ER her HR was very slow and she was in junctional bradycardia. Metoprolol and Propafenone were stopped.  Her HR improved and PPM was not needed.  She is on ASA 81 mg and Simvastatin 20 mg for CAD.  She is on Eliquis 2.5 mg BID for stroke prevention.  She is on Lasix 40 mg for pleural effusion.  \par

## 2019-11-12 NOTE — DISCUSSION/SUMMARY
[FreeTextEntry1] : 85-year-old female with CAD s/p MI, severe MR s/p bioprosthetic MVR on 8/4/15, and AFIB presents for followup.  Patient was last seen on 6/12/19.  Patient had a syncopal episode and was rushed to [facility] on 8/11.  In the ER her HR was very slow and she was in junctional bradycardia. Metoprolol and Propafenone were stopped.  Her HR improved and PPM was not needed.  She is on ASA 81 mg and Simvastatin 20 mg for CAD.  She is on Eliquis 2.5 mg BID for stroke prevention.  She is on Lasix 40 mg for pleural effusion.  \par \par (1) Syncope, junctional bradycardia - Patient has been stable off Metoprolol and Propafenone.  She wore a 7-day Zio Patch and it showed average HR of 61, short runs of PAT (18 beats the longest), without significant pauses.  Patient was reassured. \par \par (2) PAF -  Her [id number] score is 4 (age>75, CAD, female gender).  She should continue Eliquis 2.5 mg BID for stroke prevention. \par \par (3) CAD s/p MI, LV dysfunction, LV enlargement - Patient is stable.  She should continue ASA (reduce to QOD because of easy bruising) and Simvastatin 20 mg daily.  I advised patient to start Losartan 25 mg.\par \par (4) Pleural effusion, chronic - Patient is stable. I advised her to continue Lasix and KCL.\par \par (5) Severe MR s/p bioprosthetic MVR on 8/4/15 - Patient is stable.  \par \par (6) Followup - 3 months.

## 2019-11-12 NOTE — PHYSICAL EXAM
[General Appearance - Well Developed] : well developed [Normal Appearance] : normal appearance [Well Groomed] : well groomed [General Appearance - Well Nourished] : well nourished [No Deformities] : no deformities [General Appearance - In No Acute Distress] : no acute distress [Normal Conjunctiva] : the conjunctiva exhibited no abnormalities [Eyelids - No Xanthelasma] : the eyelids demonstrated no xanthelasmas [Normal Oral Mucosa] : normal oral mucosa [No Oral Pallor] : no oral pallor [No Oral Cyanosis] : no oral cyanosis [Normal Jugular Venous A Waves Present] : normal jugular venous A waves present [Normal Jugular Venous V Waves Present] : normal jugular venous V waves present [No Jugular Venous Sandoval A Waves] : no jugular venous sandoval A waves [Respiration, Rhythm And Depth] : normal respiratory rhythm and effort [Exaggerated Use Of Accessory Muscles For Inspiration] : no accessory muscle use [Bibasilar Rales/Crackles] : bibasilar rales [Heart Sounds] : normal S1 and S2 [Arterial Pulses Normal] : the arterial pulses were normal [Edema] : no peripheral edema present [Abdomen Soft] : soft [Abdomen Tenderness] : non-tender [Abdomen Mass (___ Cm)] : no abdominal mass palpated [Nail Clubbing] : no clubbing of the fingernails [Cyanosis, Localized] : no localized cyanosis [Petechial Hemorrhages (___cm)] : no petechial hemorrhages [] : no ischemic changes [Oriented To Time, Place, And Person] : oriented to person, place, and time [Affect] : the affect was normal [Mood] : the mood was normal [No Anxiety] : not feeling anxious [Normal] : the heart rate was normal [FreeTextEntry1] : limited mobility.

## 2019-11-13 ENCOUNTER — APPOINTMENT (OUTPATIENT)
Dept: CARDIOLOGY | Facility: CLINIC | Age: 84
End: 2019-11-13
Payer: MEDICARE

## 2019-11-13 VITALS
TEMPERATURE: 98.2 F | HEART RATE: 73 BPM | RESPIRATION RATE: 17 BRPM | BODY MASS INDEX: 24.37 KG/M2 | WEIGHT: 129 LBS | DIASTOLIC BLOOD PRESSURE: 71 MMHG | SYSTOLIC BLOOD PRESSURE: 119 MMHG | OXYGEN SATURATION: 99 %

## 2019-11-13 PROCEDURE — 99214 OFFICE O/P EST MOD 30 MIN: CPT

## 2020-02-19 NOTE — CONSULT NOTE ADULT - SUBJECTIVE AND OBJECTIVE BOX
Date/Time Patient Seen:  		  Referring MD:   Data Reviewed	       Patient is a 85y old  Female who presents with a chief complaint of Bradycardia (13 Aug 2019 11:41)      Subjective/HPI  in bed  seen and examined  vs and meds reviewed  labs reviewed  cxr and ct chest reviewed    History and Physical:   Source of Information	Chart(s), Patient, Child  Comments/Contacts	Patient's daughter and granddaughter requested to translate instead of   Outpatient Providers	PCP: Dr. Turner Estrada, Cardiologist: Dr. Giovanna Pineda       History of Present Illness:  Reason for Admission: Bradycardia  History of Present Illness:   85 year old Chinese-speaking female with a past medical history of CHF (last echo 2 months ago normal per family), MI in s/p mitral valve replacement, aortic valve repair in 2015, atrial fibrillation on eliquis,  HLD, and HTN, BIBEMS s/p developing weakness and fatigue at 9:30AM. She was at her functional baseline early this morning, and then was subsequently found by family to be lying on the couch essentially unresponsive with pallor, cyanosis of her lips and foaming at the mouth. EMS was called. After several minutes, family was able to arouse her, and patient reported feeling dizzy and nauseous. As per chart review, EMS found patient  weak appearing, hypotensive and bradycardic, and patient was given 1/2 amp of atropine IV and heart rate improved from mid 30s to mid 40s-50s with subsequent rise in BP. Patient reports that she is still mildy dizzy, weak, and fatigued, but feeling better. She is unsure if she lost consciousness during the episode. She denies chest pain, shortness of breath, diaphoresis, fever, chills, nausea, abdominal pain, recent fall, recent change in medications.    ED vitals: T: 97.9, HR: 46 BP: 108/55 RR: 18 O2 saturation: initially 99 on nonrebreather then 100 on NC 2 L. In the ED patient was given: IVF bolus X2, glucagon 1mg IV. EKG: Wide QRS, RBBB, left posterior fascicle block, bifascicular block. One episode of vomiting with chest pain following glucagon administration. EKG unchanged. Labs notable for WBC 4.27, H/h 10.9/34, BUN/Cr 31/1.4 (baseline unknown), initial troponin <.015. CXR: Small left pleural effusion with basilar atelectasis or consolidation.     Patient was evaluated by cardio consult (Dr. Ceron) in the ED.           PAST MEDICAL HISTORY:  Atrial fibrillation     CHF (congestive heart failure)     High cholesterol     HTN (hypertension).     PAST SURGICAL HISTORY:  H/O aortic valve repair     S/P CABG (coronary artery bypass graft)     S/P mitral valve repair.     Social History:  Social History (marital status, living situation, occupation, tobacco use, alcohol and drug use, and sexual history): Lives at home with  and daughter's family. Independent of ADLs, ambulates without assistance. Never smoker. No alcohol or drug use.     Tobacco Screening:  · Core Measure Site	Yes  · Has the patient used tobacco in the past 30 days?	No    Risk Assessment:    Present on Admission:  Deep Venous Thrombosis	no  Pulmonary Embolus	no  Urinary Catheter	no  Central Venous Catheter/PICC Line	no     Heart Failure:  Does this patient have a history of or has been diagnosed with heart failure? yes.     PAST MEDICAL & SURGICAL HISTORY:  Atrial fibrillation  CHF (congestive heart failure)  HTN (hypertension)  High cholesterol  Arrhythmia  Mitral regurgitation  Pleural effusion  H/O aortic valve repair  S/P mitral valve repair  S/P CABG (coronary artery bypass graft)  No significant past surgical history        Medication list         MEDICATIONS  (STANDING):  aspirin enteric coated 81 milliGRAM(s) Oral daily  azithromycin  IVPB      cefTRIAXone   IVPB      enoxaparin Injectable 60 milliGRAM(s) SubCutaneous daily  furosemide    Tablet 40 milliGRAM(s) Oral daily  guaiFENesin ER 1200 milliGRAM(s) Oral every 12 hours  lactobacillus acidophilus 1 Tablet(s) Oral three times a day with meals  simvastatin 20 milliGRAM(s) Oral at bedtime    MEDICATIONS  (PRN):  ALBUTerol/ipratropium for Nebulization. 3 milliLiter(s) Nebulizer two times a day PRN Shortness of Breath and/or Wheezing         Vitals log        ICU Vital Signs Last 24 Hrs  T(C): 36.7 (13 Aug 2019 11:59), Max: 37.2 (12 Aug 2019 15:44)  T(F): 98.1 (13 Aug 2019 11:59), Max: 99 (12 Aug 2019 15:44)  HR: 52 (13 Aug 2019 11:59) (50 - 55)  BP: 98/61 (13 Aug 2019 11:59) (94/53 - 115/57)  BP(mean): --  ABP: --  ABP(mean): --  RR: 18 (13 Aug 2019 11:59) (17 - 20)  SpO2: 99% (13 Aug 2019 11:59) (98% - 100%)           Input and Output:  I&O's Detail      Lab Data                        9.6    7.32  )-----------( 126      ( 13 Aug 2019 07:04 )             30.0     08-13    142  |  109<H>  |  38<H>  ----------------------------<  85  4.3   |  27  |  1.10    Ca    8.4<L>      13 Aug 2019 07:04  Mg     2.4     08-13        CARDIAC MARKERS ( 12 Aug 2019 13:54 )  .036 ng/mL / x     / 135 U/L / x     / 1.1 ng/mL  CARDIAC MARKERS ( 12 Aug 2019 08:21 )  .037 ng/mL / x     / 99 U/L / x     / <1.0 ng/mL        Review of Systems	      Objective     Physical Examination    heart s1s2  lung dec BS      Pertinent Lab findings & Imaging      Rico:  NO   Adequate UO     I&O's Detail           Discussed with:     Cultures:	        Radiology      EXAM:  CT CHEST                            PROCEDURE DATE:  08/13/2019          INTERPRETATION:  CLINICAL INFORMATION: Shortness of breath. History of   CHF of bradycardia. Now with bloody sputum    COMPARISON: Chest radiograph 8/13/2019 and CT scan chest 6/29/2015.    PROCEDURE:   CT of the Chest was performed without intravenous contrast.  Sagittal and coronal reformats were performed.      FINDINGS:    LUNGS AND AIRWAYS:   PLEURA:   There is a small, loculated left-sided pleural effusion associated with   pleural thickening. Finding could represent complicated pleural   collection/empyema in the appropriate clinical setting.  There is underlying airspace consolidation left lower lobe, which may   reflect chronic atelectasis and/or pneumonia.  There is peripheral consolidation in the inferior aspect left lingula   lobe, which could reflect atelectasis and/or pneumonia.  There is linear subpleural scarring or fibrosis left upper lobe.    There is a small to moderate right-sided pleural effusion with underlying   compressive atelectasis.  There are patchy groundglass opacities right lung apex, posteriorly   segment right upper lobe and superior segment right lower lobe, findings   which could reflect pulmonary edema or infection/pneumonia.    There is mucous plugging left mainstem bronchus. The central airways   remain patent.    MEDIASTINUM AND ENMANUEL: Clusters of shotty pretracheal mediastinal lymph   nodes, stable in appearance.  Evaluation the pulmonary hilum is limited without intravenous contrast.    VESSELS: Atherosclerotic calcification of the thoracic aorta with mild   ectasia of the ascending aorta measuring up to 3.6 cm.  Coronary artery calcifications.  Prominence of the main pulmonary arterial trunk; finding could be   associated with pulmonary arterial hypertension.    HEART: Cardiomegaly. Mitral valve replacement.  Aortic valve repair. No pericardial effusion.    CHEST WALL AND LOWER NECK: Median sternotomy.    VISUALIZED UPPER ABDOMEN: Calcification right upper quadrant could   represent calcified gallstone, not adequately evaluated on this exam.    BONES: No acute osseous abnormality.    IMPRESSION:     Small loculated left-sided pleural effusion and associated with pleural   thickening, correlate for complex pleural collection or empyema.     Inferior lingula and left lower lobe airspace consolidation could reflect   chronic atelectasis and/or pneumonia.    Patchy groundglass opacities predominantly in the right upper lung zones   as discussed which may reflect edema versus infection/pneumonia.  Small to moderate right-sided pleural effusion.    Other findings as discussed above.                REILLY CEDILLO M.D., ATTENDING RADIOLOGIST  This document has been electronically signed. Aug 13 2019  1:52PM no no

## 2020-05-14 ENCOUNTER — APPOINTMENT (OUTPATIENT)
Dept: CARDIOLOGY | Facility: CLINIC | Age: 85
End: 2020-05-14

## 2020-08-05 ENCOUNTER — APPOINTMENT (OUTPATIENT)
Dept: CARDIOLOGY | Facility: CLINIC | Age: 85
End: 2020-08-05
Payer: MEDICARE

## 2020-08-05 ENCOUNTER — NON-APPOINTMENT (OUTPATIENT)
Age: 85
End: 2020-08-05

## 2020-08-05 VITALS
DIASTOLIC BLOOD PRESSURE: 66 MMHG | TEMPERATURE: 97.6 F | BODY MASS INDEX: 24.94 KG/M2 | OXYGEN SATURATION: 96 % | RESPIRATION RATE: 17 BRPM | HEART RATE: 103 BPM | WEIGHT: 132 LBS | SYSTOLIC BLOOD PRESSURE: 102 MMHG

## 2020-08-05 PROCEDURE — 93000 ELECTROCARDIOGRAM COMPLETE: CPT

## 2020-08-05 PROCEDURE — 99214 OFFICE O/P EST MOD 30 MIN: CPT

## 2020-08-05 PROCEDURE — 93306 TTE W/DOPPLER COMPLETE: CPT

## 2020-08-05 RX ORDER — ASPIRIN ENTERIC COATED TABLETS 81 MG 81 MG/1
81 TABLET, DELAYED RELEASE ORAL DAILY
Qty: 30 | Refills: 5 | Status: DISCONTINUED | COMMUNITY
Start: 2018-06-12 | End: 2020-08-05

## 2020-08-05 RX ORDER — PROPAFENONE HYDROCHLORIDE 150 MG/1
150 TABLET, FILM COATED ORAL
Qty: 135 | Refills: 3 | Status: DISCONTINUED | COMMUNITY
Start: 2017-03-03 | End: 2020-08-05

## 2020-08-14 NOTE — PHYSICAL EXAM
[General Appearance - Well Developed] : well developed [Normal Appearance] : normal appearance [General Appearance - Well Nourished] : well nourished [Well Groomed] : well groomed [No Deformities] : no deformities [Normal Conjunctiva] : the conjunctiva exhibited no abnormalities [General Appearance - In No Acute Distress] : no acute distress [Eyelids - No Xanthelasma] : the eyelids demonstrated no xanthelasmas [No Oral Pallor] : no oral pallor [Normal Oral Mucosa] : normal oral mucosa [No Oral Cyanosis] : no oral cyanosis [Normal Jugular Venous A Waves Present] : normal jugular venous A waves present [Normal Jugular Venous V Waves Present] : normal jugular venous V waves present [No Jugular Venous Sandoval A Waves] : no jugular venous sandoval A waves [Respiration, Rhythm And Depth] : normal respiratory rhythm and effort [Exaggerated Use Of Accessory Muscles For Inspiration] : no accessory muscle use [Bibasilar Rales/Crackles] : bibasilar rales [Heart Sounds] : normal S1 and S2 [Arterial Pulses Normal] : the arterial pulses were normal [Edema] : no peripheral edema present [Normal] : the heart rate was normal [Abdomen Tenderness] : non-tender [Abdomen Soft] : soft [Abdomen Mass (___ Cm)] : no abdominal mass palpated [FreeTextEntry1] : limited mobility.  [Nail Clubbing] : no clubbing of the fingernails [Cyanosis, Localized] : no localized cyanosis [Petechial Hemorrhages (___cm)] : no petechial hemorrhages [] : no ischemic changes [Mood] : the mood was normal [Affect] : the affect was normal [Oriented To Time, Place, And Person] : oriented to person, place, and time [No Anxiety] : not feeling anxious

## 2020-08-14 NOTE — REASON FOR VISIT
[FreeTextEntry1] : Patient feels well. She reports fast HR with exertion but is asymptomatic. Her BP is low. She is taking Metoprolol QOD. I advised patient that she may take it QD if HR is too fast. Patient denies CP. Patient denies SOB. She is not taking Aspirin due to easy bruising. She forgets to take Eliquis BID sometimes and not taking statins regularly. She is on Furosemide 20mg QD.  I advised patient to take cholesterol medications and blood thinner as prescribed. I advised patient to undergo an echocardiogram. Fu in 4 months.

## 2020-11-17 NOTE — H&P ADULT - NSICDXPASTSURGICALHX_GEN_ALL_CORE_FT
Requested Prescriptions   Pending Prescriptions Disp Refills    ALPRAZolam (XANAX) 0.25 MG tablet [Pharmacy Med Name: ALPRAZOLAM 0.25 MG TABLET] 30 tablet 0     Sig: TAKE 1 TABLET BY MOUTH THREE TIMES A DAY AS NEEDED FOR ANXIETY       There is no refill protocol information for this order        Routing refill request to provider for review/approval because:  Drug not on the [facility] refill protocol   STEPHANIA LuceroN-RN  [facility]          
PAST SURGICAL HISTORY:  H/O aortic valve repair     S/P CABG (coronary artery bypass graft)     S/P mitral valve repair

## 2020-12-02 ENCOUNTER — APPOINTMENT (OUTPATIENT)
Dept: CARDIOLOGY | Facility: CLINIC | Age: 85
End: 2020-12-02
Payer: MEDICARE

## 2020-12-02 VITALS
WEIGHT: 130 LBS | TEMPERATURE: 97.4 F | BODY MASS INDEX: 24.56 KG/M2 | OXYGEN SATURATION: 98 % | DIASTOLIC BLOOD PRESSURE: 72 MMHG | RESPIRATION RATE: 17 BRPM | HEART RATE: 102 BPM | SYSTOLIC BLOOD PRESSURE: 119 MMHG

## 2020-12-02 PROCEDURE — 99214 OFFICE O/P EST MOD 30 MIN: CPT

## 2020-12-02 PROCEDURE — 99072 ADDL SUPL MATRL&STAF TM PHE: CPT

## 2020-12-02 RX ORDER — LOSARTAN POTASSIUM 25 MG/1
25 TABLET, FILM COATED ORAL DAILY
Qty: 30 | Refills: 5 | Status: DISCONTINUED | COMMUNITY
Start: 2019-08-22 | End: 2020-12-02

## 2020-12-02 RX ORDER — METOPROLOL SUCCINATE 25 MG/1
25 TABLET, EXTENDED RELEASE ORAL DAILY
Qty: 30 | Refills: 5 | Status: DISCONTINUED | COMMUNITY
Start: 2017-02-08 | End: 2020-12-02

## 2020-12-02 NOTE — PHYSICAL EXAM
[General Appearance - Well Developed] : well developed [Normal Appearance] : normal appearance [Well Groomed] : well groomed [General Appearance - Well Nourished] : well nourished [No Deformities] : no deformities [General Appearance - In No Acute Distress] : no acute distress [Normal Conjunctiva] : the conjunctiva exhibited no abnormalities [Eyelids - No Xanthelasma] : the eyelids demonstrated no xanthelasmas [Normal Oral Mucosa] : normal oral mucosa [No Oral Pallor] : no oral pallor [No Oral Cyanosis] : no oral cyanosis [Normal Jugular Venous A Waves Present] : normal jugular venous A waves present [Normal Jugular Venous V Waves Present] : normal jugular venous V waves present [No Jugular Venous Sandoval A Waves] : no jugular venous sandoval A waves [Respiration, Rhythm And Depth] : normal respiratory rhythm and effort [Exaggerated Use Of Accessory Muscles For Inspiration] : no accessory muscle use [Bibasilar Rales/Crackles] : bibasilar rales [Heart Sounds] : normal S1 and S2 [Arterial Pulses Normal] : the arterial pulses were normal [Edema] : no peripheral edema present [Normal] : the heart rate was normal [Abdomen Soft] : soft [Abdomen Tenderness] : non-tender [Abdomen Mass (___ Cm)] : no abdominal mass palpated [Nail Clubbing] : no clubbing of the fingernails [Cyanosis, Localized] : no localized cyanosis [Petechial Hemorrhages (___cm)] : no petechial hemorrhages [] : no ischemic changes [Oriented To Time, Place, And Person] : oriented to person, place, and time [Affect] : the affect was normal [Mood] : the mood was normal [No Anxiety] : not feeling anxious [FreeTextEntry1] : limited mobility.

## 2020-12-02 NOTE — HISTORY OF PRESENT ILLNESS
[FreeTextEntry1] : 86-year-old female with CAD s/p MI, severe MR s/p bioprosthetic MVR on 8/4/15, and AFIB presents for followup.  Patient was last seen on 8/22/19 following syncope.  She wore a 7-day Zio Patch and it showed average HR of 61, short runs of PAT (18 beats the longest), without significant pauses.  She is off Metoprolol and Propafenone.  I advised patient to start Losartan 25 mg which she had to stop because of low BP.  She is on ASA 81 mg (reduced to QOD) and Simvastatin 20 mg for CAD.  She is on Eliquis 2.5 mg BID for stroke prevention.  She is on Lasix 20 mg for pleural effusion.  \par \par Patient has been stable.  Patient denies CP, SOB, palpitations, or lightheadedness.

## 2020-12-02 NOTE — DISCUSSION/SUMMARY
[FreeTextEntry1] : 86-year-old female with CAD s/p MI, severe MR s/p bioprosthetic MVR on 8/4/15, and AFIB presents for followup.  Patient was last seen on 8/22/19 following syncope.  She wore a 7-day Zio Patch and it showed average HR of 61, short runs of PAT (18 beats the longest), without significant pauses.  She is off Metoprolol and Propafenone.  I advised patient to start Losartan 25 mg which she had to stop because of low BP.  She is on ASA 81 mg (reduced to QOD) and Simvastatin 20 mg for CAD.  She is on Eliquis 2.5 mg BID for stroke prevention.  She is on Lasix 20 mg for pleural effusion.  \par \par Patient has been stable.  Patient denies CP, SOB, palpitations, or lightheadedness. \par \par (1) Syncope, junctional bradycardia - Patient has been stable off Metoprolol and Propafenone.  She wore a 7-day Zio Patch and it showed average HR of 61, short runs of PAT (18 beats the longest), without significant pauses.  Patient was reassured. \par \par (2) PAF -  Her [id number] score is 4 (age>75, CAD, female gender).  She should continue Eliquis 2.5 mg BID for stroke prevention. \par \par (3) CAD s/p MI, LV dysfunction, LV enlargement - Patient is stable.  She should continue ASA (reduce to QOD because of easy bruising) and Simvastatin 20 mg daily.  Patient did not tolerate Losartan 25 mg because of low BP.\par \par (4) Pleural effusion, chronic - Patient is stable. I advised her to continue Lasix 20 mg.\par \par (5) Severe MR s/p bioprosthetic MVR on 8/4/15 - Patient is stable.  \par \par (6) Followup - 6 months.

## 2020-12-02 NOTE — REASON FOR VISIT
[FreeTextEntry1] : 12/2/20 - Patient has been stable.  Patient denies CP, SOB, palpitations, or lightheadedness.  She is on Digoxin 0.125 mg QOD.  She is on Lasix 20 mg QD.  She is on Eliquis 2.5 mg BID for stroke prevention.  She is off ASA 81 mg.  Patient denies bleeding Patient reports that her BP at home is around 100-100 and her HR is around 70-80.  Her HR is elevated in office.  I advised patient to monitor her HR at home and if elevated she will contact me and I may increase Digoxin to QD.\par \par 8/5/20 - Patient feels well. She reports fast HR with exertion but is asymptomatic. Her BP is low. She is taking Metoprolol QOD. I advised patient that she may take it QD if HR is too fast. Patient denies CP. Patient denies SOB. She is not taking Aspirin due to easy bruising. She forgets to take Eliquis BID sometimes and not taking statins regularly. She is on Furosemide 20mg QD.  I advised patient to take cholesterol medications and blood thinner as prescribed. I advised patient to undergo an echocardiogram. Fu in 4 months.

## 2020-12-02 NOTE — DISCUSSION/SUMMARY
[FreeTextEntry1] : (1) PAF -  Her HR was elevated but OK at home.  I advised patient to continue Digoxin 0.125 mg QOD.  Her [id number] score is 4 (age>75, CAD, female gender).  She should continue Eliquis 2.5 mg BID for stroke prevention. \par \par (2) CAD s/p MI, LV dysfunction, LV enlargement - Patient is stable.  She is off ASA.  I advised patient to continue Simvastatin 20 mg daily.  Patient did not tolerate Losartan 25 mg because of low BP.\par \par (3) Pleural effusion, chronic - Patient is stable. I advised her to continue Lasix 20 mg.\par \par (4) Severe MR s/p bioprosthetic MVR on 8/4/15 - Patient is stable.  \par \par (5) Followup - 6 months.

## 2020-12-02 NOTE — HISTORY OF PRESENT ILLNESS
[FreeTextEntry1] : 86-year-old female with CAD s/p MI, severe MR s/p bioprosthetic MVR on 8/4/15, and AFIB presents for followup. \par \par Patient was last seen on 8/5/20.  Patient underwent an echocardiogram and it showed moderate LV systolic dysfunction with a normal bioprosthetic mitral valve.  Her HR was elevated so I advised patient to start Digoxin 0.125 mg QOD.  She is off ASA and on Simvastatin 20 mg for CAD. She is on Eliquis 2.5 mg BID for stroke prevention. She is on Lasix 20 mg for pleural effusion. \par

## 2021-06-02 ENCOUNTER — APPOINTMENT (OUTPATIENT)
Dept: CARDIOLOGY | Facility: CLINIC | Age: 86
End: 2021-06-02
Payer: MEDICARE

## 2021-06-02 ENCOUNTER — NON-APPOINTMENT (OUTPATIENT)
Age: 86
End: 2021-06-02

## 2021-06-02 VITALS
SYSTOLIC BLOOD PRESSURE: 121 MMHG | OXYGEN SATURATION: 98 % | RESPIRATION RATE: 17 BRPM | WEIGHT: 130 LBS | TEMPERATURE: 97.3 F | DIASTOLIC BLOOD PRESSURE: 71 MMHG | BODY MASS INDEX: 24.56 KG/M2 | HEART RATE: 78 BPM

## 2021-06-02 PROCEDURE — 99214 OFFICE O/P EST MOD 30 MIN: CPT

## 2021-06-02 PROCEDURE — 93000 ELECTROCARDIOGRAM COMPLETE: CPT

## 2021-07-21 ENCOUNTER — NON-APPOINTMENT (OUTPATIENT)
Age: 86
End: 2021-07-21

## 2021-07-26 NOTE — REASON FOR VISIT
[Symptom and Test Evaluation] : symptom and test evaluation [FreeTextEntry1] : 6/2/21 - Patient has been stable.  Patient denies CP or SOB.  Patient reports occasional palpitations.  Patient denies lightheadedness.  Patient denies h/o syncope.  Her ECG showed NSR, first degree AVB, RBBB, LAPB.  I advised patient to start Losartan 25 mg for her LV dysfunction.  FU 6 months. \par \par 12/2/20 - Patient has been stable.  Patient denies CP, SOB, palpitations, or lightheadedness.  She is on Digoxin 0.125 mg QOD.  She is on Lasix 20 mg QD.  She is on Eliquis 2.5 mg BID for stroke prevention.  She is off ASA 81 mg.  Patient denies bleeding.  Patient reports that her BP at home is around 100-110 and her HR is around 70-80.  Her HR is elevated in office.  I advised patient to monitor her HR at home and if elevated she will contact me and I may increase Digoxin to QD.\par \par 8/5/20 - Patient feels well. She reports fast HR with exertion but is asymptomatic. Her BP is low. She is taking Metoprolol QOD. I advised patient that she may take it QD if HR is too fast. Patient denies CP. Patient denies SOB. She is not taking Aspirin due to easy bruising. She forgets to take Eliquis BID sometimes and not taking statins regularly. She is on Furosemide 20mg QD.  I advised patient to take cholesterol medications and blood thinner as prescribed. I advised patient to undergo an echocardiogram. Fu in 4 months.

## 2021-07-26 NOTE — HISTORY OF PRESENT ILLNESS
[FreeTextEntry1] : 86-year-old female with CAD s/p MI, severe MR s/p bioprosthetic MVR on 8/4/15, and AFIB presents for followup. \par \par Patient was last seen on 12/2/20.  \par \par She is on Digoxin 0.125 mg QOD for elevated HR.  She is on Simvastatin 20 mg for CAD.  She is on Eliquis 2.5 mg BID for stroke prevention.  She is on Lasix 20 mg for pleural effusion. \par \par Last echo was on 8/5/20 and it showed moderate LV systolic dysfunction with a normal bioprosthetic mitral valve. \par

## 2021-07-27 ENCOUNTER — APPOINTMENT (OUTPATIENT)
Dept: CARDIOLOGY | Facility: CLINIC | Age: 86
End: 2021-07-27
Payer: MEDICARE

## 2021-07-27 ENCOUNTER — NON-APPOINTMENT (OUTPATIENT)
Age: 86
End: 2021-07-27

## 2021-07-27 VITALS
BODY MASS INDEX: 24 KG/M2 | TEMPERATURE: 98 F | OXYGEN SATURATION: 98 % | WEIGHT: 127 LBS | DIASTOLIC BLOOD PRESSURE: 68 MMHG | RESPIRATION RATE: 17 BRPM | HEART RATE: 61 BPM | SYSTOLIC BLOOD PRESSURE: 149 MMHG

## 2021-07-27 DIAGNOSIS — I21.19 ST ELEVATION (STEMI) MYOCARDIAL INFARCTION INVOLVING OTHER CORONARY ARTERY OF INFERIOR WALL: ICD-10-CM

## 2021-07-27 PROCEDURE — 99213 OFFICE O/P EST LOW 20 MIN: CPT

## 2021-07-27 PROCEDURE — 93000 ELECTROCARDIOGRAM COMPLETE: CPT

## 2021-09-23 NOTE — SWALLOW BEDSIDE ASSESSMENT ADULT - [facility] SWALLOW LINGUAL MOBILITY
[facility] encounter  HEMATOLOGY/ONCOLOGY OFFICE VISIT    CHIEF COMPLAINT:   Follow-up (Lymphadenopathy)      HISTORY OF PRESENT ILLNESS:  Jonh Gibson is a 79 year old male who presents for evaluation of bulky toni hepatis lymphadenopathy noted on CT scan of the abdomen and pelvis done on 05/04/2021.  He has a history of aortic stenosis and is being evaluated for TAVR.  He has a history of coronary artery bypass graft.  During the workup for TAVR, CT angiogram of the abdomen as obtained which revealed a bulky adenopathy.  It also showed a possible enhancing lesion in the peripheral zone of the prostate.  Periportal LN biopsy on 6/9/2021 showed no evidence of malignancy    He feels reasonably well.  Denies any fevers, chills, night sweats or weight loss. He has no urinary problems.  He has once episode of nocturia at night.      Please see the Review of Systems.  Positives are highlighted.   .    MEDICATIONS / ALLERGIES:  Current Outpatient Medications   Medication Sig Dispense Refill   • clotrimazole (LOTRIMIN) 1 % cream Apply topically 2 times daily. 30 g 1   • triamcinolone (ARISTOCORT) 0.1 % cream Apply topically 3 times daily. 15 g 5   • finasteride (PROSCAR) 5 MG tablet Take 1 tablet by mouth daily.     • levothyroxine 100 MCG tablet      • LORazepam (ATIVAN) 0.5 MG tablet Take 0.5 mg by mouth nightly as needed.     • nystatin-triamcinolone (MYCOLOG II) 193775-1.1 UNIT/GM-% cream Apply topically 2 times daily. 60 g 0   • metoPROLOL tartrate (LOPRESSOR) 25 MG tablet Take 0.5 tablets by mouth every 12 hours. 60 tablet 1   • apixaBAN (ELIQUIS) 5 MG Tab Take 1 tablet by mouth every 12 hours. 60 tablet 11   • omeprazole (PrilOSEC) 20 MG capsule Take 1 capsule by mouth daily. 90 capsule 3   • metOLazone (ZAROXOLYN) 5 MG tablet Take 1 tablet by mouth daily. 30 tablet 11   • potassium chloride (KLOR-CON SPRINKLE) 10 MEQ ER capsule Take 1 capsule by mouth 2 times daily. 60 capsule 11   • Probiotic  Product (PROBIOTIC PO) Take by mouth daily.      • B Complex Vitamins (B-COMPLEX/B-12 PO) Take by mouth daily.      • cyanocobalamin (Vitamin B-12) 100 MCG tablet Take 50 mcg by mouth daily.     • acetaminophen (TYLENOL) 500 MG tablet Take 1,000 mg by mouth every 4 hours as needed for Pain.      • buPROPion XL (WELLBUTRIN XL) 150 MG 24 hr tablet 150 mg 2 times daily.     • furosemide (Lasix) 40 MG tablet Take 40 mg by mouth daily.      • Ferrous Sulfate (Iron) 325 (65 Fe) MG Tab Take by mouth daily.      • levothyroxine 100 MCG capsule Take by mouth daily.      • aspirin 81 MG tablet Take 81 mg by mouth daily.     • terazosin (HYTRIN) 10 MG capsule Take 20 mg by mouth nightly.     • ALPRAZolam (XANAX) 0.25 MG tablet Take 0.25 mg by mouth nightly as needed for Sleep.     • Ergocalciferol (Vitamin D2) 50 mcg (2,000 units) tablet Take by mouth daily.      • ropinirole (REQUIP) 3 MG tablet Take 3 mg by mouth nightly.     • benazepril (LOTENSIN) 40 MG tablet TAKE 1 TABLET (40 MG TOTAL) BY MOUTH DAILY. (Patient taking differently: 40 mg daily. ) 90 tablet 1     No current facility-administered medications for this visit.     ALLERGIES:   Allergen Reactions   • Amlodipine SWELLING and Other (See Comments)     Leg swelling     • Atenolol Other (See Comments)     Leg pain and varicose vein burning.     • Nebivolol Other (See Comments)     Leg pain and swelling.        Review of systems:  Selma Max MA  2021  1:56 PM  Sign when Signing Visit  Jonh Gibson is a 79 year old male here for  Chief Complaint   Patient presents with   • Follow-up     Lymphadenopathy     Denies latex allergy or sensitivity.    Medication verified, no changes.  PCP and Pharmacy verified.    Social History     Tobacco Use   Smoking Status Former Smoker   • Packs/day: 0.00   • Types: Cigars   • Quit date:    • Years since quittin.7   Smokeless Tobacco Never Used     Advance Directives Filed: No    ECOG:   ECOG [21 1350]    ECOG Performance Status 1       Vitals:    There were no vitals taken for this visit.    These vital signs are:  Not within defined parameters:  The following abnormal VS were verbally communicated to nurse.    Height: No.  Ht Readings from Last 1 Encounters:   09/21/21 5' 10\" (1.778 m)     Weight:Yes, shoes off.  Wt Readings from Last 3 Encounters:   09/21/21 104 kg (229 lb 3.2 oz)   09/14/21 100.7 kg (222 lb)   08/12/21 99.8 kg (220 lb)       BMI: There is no height or weight on file to calculate BMI.    REVIEW OF SYSTEMS  GENERAL:  Patient denies headache, fevers, chills, night sweats, excessive fatigue, change in appetite, weight loss, dizziness  ALLERGIC/IMMUNOLOGIC: Verified allergies: Yes  EYES:  Patient denies significant visual difficulties, double vision, blurred vision  ENT/MOUTH: Patient denies problems with hearing, sore throat, sinus drainage, mouth sores  ENDOCRINE:  Patient denies diabetes, thyroid disease, hormone replacement, hot flashes  HEMATOLOGIC/LYMPHATIC: Patient denies easy bruising, bleeding, tender lymph nodes, swollen lymph nodes  BREASTS: Patient denies abnormal masses of breast, nipple discharge, pain  RESPIRATORY:  Patient denies lung pain with breathing, cough, coughing up blood, shortness of breath  CARDIOVASCULAR:  Patient denies anginal chest pain, palpitations, shortness of breath when lying flat, peripheral edema  GASTROINTESTINAL: Patient denies abdominal pain , nausea, vomiting, diarrhea, GI bleeding, constipation, change in bowel habits, heartburn, sensation of feeling full, difficulty swallowing  : Patient denies blood in the urine, burning with urination, frequency, urgency, hesitancy, incontinence  MUSCULOSKELETAL:  Patient denies joint pain, bone pain, joint swelling, redness, decreased range of motion  SKIN:  Patient denies chronic rashes, inflammation, ulcerations, skin changes, itching  NEUROLOGIC:  Patient denies loss of balance, areas of focal weakness, abnormal  gait, sensory problems, numbness, tingling  PSYCHIATRIC: Patient denies insomnia, depression, anxiety    This patient reported abnormal symptoms that needed immediate verbal communication: Yes, these symptoms included high bp and were reported to nurse.        PHYSICAL EXAM:  Vital Signs:   Oncology Encounter Vitals [09/23/21 1352]   ONC OP Encounter Vitals Group      BP (!) 201/95      Heart Rate 85      Resp 18      Temp 97.6 °F (36.4 °C)      Temp src Oral      SpO2 100 %      Weight 231 lb 8 oz (105 kg)      Height       Pain Score  0      Pain Location       Pain Education?       BSA (Calculated - m2) - [employer]       BMI (Calculated)      ECOG Performance Status:   ECOG [09/23/21 1350]   ECOG Performance Status 1     General: The patient is alert, well-developed, well-nourished, no distress.  Skin: Warm, normal color, normal texture, normal turgor and without rash.  Head: Normocephalic, atraumatic.  Neck: SuppleNeurologic: Motor strength normal. Coordination normal. No tremor noted.  Psychiatric: Cooperative. Appropriate mood and affect. Normal judgment.    DATA REVIEWED:  Laboratory Data:  Recent Labs   Lab 09/16/21 0927   WBC 6.5   RBC 4.48*   HGB 12.4*   HCT 37.4*   MCV 83.5      Absolute Neutrophils 3.9   Absolute Lymphocytes 1.3   Absolute Monocytes 1.1*   Absolute Eosinophils  0.1   Absolute Basophils 0.1     Recent Labs   Lab 09/16/21 0927 06/29/21  0834 06/28/21  1638   Glucose 107*  --   --    Sodium 130*  --   --    Potassium 4.0  --  3.4   Chloride 94*  --   --    Carbon Dioxide 28  --   --    BUN 14  --   --    Creatinine 0.87  --   --    Calcium 9.2  --   --    Magnesium  --   --  1.8   Protein, Total 8.3*  8.7*  --   --    Albumin 3.7   < >  --    GOT/AST 24  --   --    Alkaline Phosphatase 104  --   --    GPT 27  --   --     < > = values in this interval not displayed.     Recent Labs   Lab 09/16/21 0927   Anion Gap 12   Globulin 5.0*   LD, Total 221   Bilirubin, Total 0.6        Results for MINGO EVANS (MRN [id number]) as of 9/23/2021 14:03   Ref. Range 5/20/2021 13:37 9/16/2021 09:27   Kappa/ Lambda Ratio Latest Ref Range: 0.26 - 1.65  1.64 1.62   Kappa, Free Latest Ref Range: 0.33 - 1.94 mg/dL 6.26 (H) 5.02 (H)   Lambda, Free Latest Ref Range: 0.57 - 2.63 mg/dL 3.81 (H) 3.09 (H)   M PROTEIN Latest Ref Range: <=0.0 g/dL 0.8 (H) 1.0 (H)       Imaging Studies:none    ASSESSMENT AND PLAN:  ASSESSMENT: Lymphadenopathy  (primary encounter diagnosis)  Comment: Laboratory data reviewed and analyzed.  I have reviewed the images of the scan and compared them to prior images..  No progression.   Plan: Continue to observe and monitor.  No intervention is needed at this time.  RTC 6 months with:  CBC WITH DIFFERENTIAL, COMPREHENSIVE METABOLIC         PANEL, LACTATE DEHYDROGENASE, PLASMA CELL         PROFILE, CT CHEST ABDOMEN PELVIS W CONTRAST        Prior to visit.       MGUS (monoclonal gammopathy of unknown significance)  Comment: IgM.  Laboratory data reviewed and analyzed..  Mild increase.  No treatment needed.  Plan: Recheck in 6 months          The patient, wife indicated understanding of the diagnosis and agreed with the plan of care. The patient is encouraged to call between now and next visit with any problems, questions or concerns that arise.         within functional limits

## 2021-11-13 NOTE — REASON FOR VISIT
[Symptom and Test Evaluation] : symptom and test evaluation [FreeTextEntry1] : 88-year-old female with CAD s/p MI, severe MR s/p bioprosthetic MVR on 8/4/15, LV dysfunction EF 35%,  PAF, presents for followup. \par \par Patient was last seen on 6/2/21.  I advised patient to start Losartan 25 mg for her LV dysfunction.  Patient called on 7/21/21 reporting slow HR (but asymptomatic).  I advised patient to stop Digoxin.\par \par She is on Simvastatin 20 mg for CAD.  She is on Eliquis 2.5 mg BID for stroke prevention.  She is on Lasix 20 mg for pleural effusion. \par \par Last echo was on 8/5/20 and it showed moderate LV systolic dysfunction EF 35% with a normal bioprosthetic mitral valve. \par \par

## 2021-11-13 NOTE — HISTORY OF PRESENT ILLNESS
[FreeTextEntry1] : 7/27/21 - Patient called on 7/21/21 reporting that her HR was slow.  I advised patient to stop Digoxin 0.125 mg.  Patient reports that her HR is OK now and her BP is fine.  Patient denies CP, SOB, palpitations, or lightheadedness.  \par \par 6/2/21 - Patient has been stable.  Patient denies CP or SOB.  Patient reports occasional palpitations.  Patient denies lightheadedness.  Patient denies h/o syncope.  Her ECG showed NSR, first degree AVB, RBBB, LAPB.  I advised patient to start Losartan 25 mg for her LV dysfunction.  FU 6 months. \par \par 12/2/20 - Patient has been stable.  Patient denies CP, SOB, palpitations, or lightheadedness.  She is on Digoxin 0.125 mg QOD.  She is on Lasix 20 mg QD.  She is on Eliquis 2.5 mg BID for stroke prevention.  She is off ASA 81 mg.  Patient denies bleeding.  Patient reports that her BP at home is around 100-110 and her HR is around 70-80.  Her HR is elevated in office.  I advised patient to monitor her HR at home and if elevated she will contact me and I may increase Digoxin to QD.\par \par 8/5/20 - Patient feels well. She reports fast HR with exertion but is asymptomatic. Her BP is low. She is taking Metoprolol QOD. I advised patient that she may take it QD if HR is too fast. Patient denies CP. Patient denies SOB. She is not taking Aspirin due to easy bruising. She forgets to take Eliquis BID sometimes and not taking statins regularly. She is on Furosemide 20mg QD.  I advised patient to take cholesterol medications and blood thinner as prescribed. I advised patient to undergo an echocardiogram. Fu in 4 months.

## 2021-11-16 ENCOUNTER — APPOINTMENT (OUTPATIENT)
Dept: CARDIOLOGY | Facility: CLINIC | Age: 86
End: 2021-11-16
Payer: MEDICARE

## 2021-11-16 VITALS
OXYGEN SATURATION: 99 % | DIASTOLIC BLOOD PRESSURE: 73 MMHG | BODY MASS INDEX: 24 KG/M2 | HEART RATE: 70 BPM | WEIGHT: 127 LBS | SYSTOLIC BLOOD PRESSURE: 144 MMHG | TEMPERATURE: 97.4 F | RESPIRATION RATE: 17 BRPM

## 2021-11-16 DIAGNOSIS — K59.00 CONSTIPATION, UNSPECIFIED: ICD-10-CM

## 2021-11-16 DIAGNOSIS — G47.62 SLEEP RELATED LEG CRAMPS: ICD-10-CM

## 2021-11-16 PROCEDURE — 93306 TTE W/DOPPLER COMPLETE: CPT

## 2021-11-16 PROCEDURE — 99213 OFFICE O/P EST LOW 20 MIN: CPT

## 2021-11-16 RX ORDER — SENNOSIDES 8.6 MG TABLETS 8.6 MG/1
8.6 TABLET ORAL
Qty: 60 | Refills: 5 | Status: ACTIVE | COMMUNITY
Start: 2021-11-16 | End: 1900-01-01

## 2021-11-16 RX ORDER — MAGNESIUM OXIDE 241.3 MG/1000MG
400 TABLET ORAL DAILY
Qty: 90 | Refills: 1 | Status: ACTIVE | COMMUNITY
Start: 2021-11-16 | End: 1900-01-01

## 2021-12-01 NOTE — HISTORY OF PRESENT ILLNESS
[FreeTextEntry1] : 11/16/21 - Patient reports leg pain and had received acupuncture treatments. I advised patient to get Venous Doppler. I advised patient to try supplementing with magnesium which would help with muscle cramps and also with constipation. I advised patient to undergo an echocardiogram. \par \par 7/27/21 - Patient called on 7/21/21 reporting that her HR was slow.  I advised patient to stop Digoxin 0.125 mg.  Patient reports that her HR is OK now and her BP is fine.  Patient denies CP, SOB, palpitations, or lightheadedness.  \par \par 6/2/21 - Patient has been stable.  Patient denies CP or SOB.  Patient reports occasional palpitations.  Patient denies lightheadedness.  Patient denies h/o syncope.  Her ECG showed NSR, first degree AVB, RBBB, LAPB.  I advised patient to start Losartan 25 mg for her LV dysfunction.  FU 6 months. \par \par 12/2/20 - Patient has been stable.  Patient denies CP, SOB, palpitations, or lightheadedness.  She is on Digoxin 0.125 mg QOD.  She is on Lasix 20 mg QD.  She is on Eliquis 2.5 mg BID for stroke prevention.  She is off ASA 81 mg.  Patient denies bleeding.  Patient reports that her BP at home is around 100-110 and her HR is around 70-80.  Her HR is elevated in office.  I advised patient to monitor her HR at home and if elevated she will contact me and I may increase Digoxin to QD.\par \par 8/5/20 - Patient feels well. She reports fast HR with exertion but is asymptomatic. Her BP is low. She is taking Metoprolol QOD. I advised patient that she may take it QD if HR is too fast. Patient denies CP. Patient denies SOB. She is not taking Aspirin due to easy bruising. She forgets to take Eliquis BID sometimes and not taking statins regularly. She is on Furosemide 20mg QD.  I advised patient to take cholesterol medications and blood thinner as prescribed. I advised patient to undergo an echocardiogram. Fu in 4 months.

## 2021-12-01 NOTE — REASON FOR VISIT
[Symptom and Test Evaluation] : symptom and test evaluation [FreeTextEntry1] : 88-year-old female with CAD s/p MI, severe MR s/p bioprosthetic MVR on 8/4/15, LV dysfunction EF 35%,  PAF, presents for followup. \par \par Patient was last seen on 7/27/21 for slow HR.  I advised patient to stop Digoxin.\par \par She is on Simvastatin 20 mg for CAD.  She is on Eliquis 2.5 mg BID for stroke prevention.  She is on Lasix 20 mg for pleural effusion. \par \par Last echo was on 8/5/20 and it showed moderate LV systolic dysfunction EF 35% with a normal bioprosthetic mitral valve.

## 2022-01-03 NOTE — PROGRESS NOTE ADULT - I WAS PHYSICALLY PRESENT FOR THE KEY PORTIONS OF THE EVALUATION AND MANAGEMENT (E/M) SERVICE PROVIDED.  I AGREE WITH THE ABOVE HISTORY, PHYSICAL, AND PLAN WHICH I HAVE REVIEWED AND EDITED WHERE APPROPRIATE
Pt aware and will go to the ER for eval
Statement Selected

## 2022-03-01 ENCOUNTER — APPOINTMENT (OUTPATIENT)
Dept: CARDIOLOGY | Facility: CLINIC | Age: 87
End: 2022-03-01

## 2022-03-09 ENCOUNTER — APPOINTMENT (OUTPATIENT)
Dept: CARDIOLOGY | Facility: CLINIC | Age: 87
End: 2022-03-09
Payer: MEDICARE

## 2022-03-09 ENCOUNTER — NON-APPOINTMENT (OUTPATIENT)
Age: 87
End: 2022-03-09

## 2022-03-09 VITALS
HEART RATE: 72 BPM | WEIGHT: 124 LBS | TEMPERATURE: 97.4 F | DIASTOLIC BLOOD PRESSURE: 71 MMHG | OXYGEN SATURATION: 99 % | BODY MASS INDEX: 23.43 KG/M2 | SYSTOLIC BLOOD PRESSURE: 118 MMHG | RESPIRATION RATE: 18 BRPM

## 2022-03-09 PROCEDURE — 93000 ELECTROCARDIOGRAM COMPLETE: CPT

## 2022-03-09 PROCEDURE — 99214 OFFICE O/P EST MOD 30 MIN: CPT

## 2022-03-09 NOTE — REASON FOR VISIT
[FreeTextEntry1] : 88-year-old female with CAD s/p MI, severe MR s/p bioprosthetic MVR on 8/4/15, LV dysfunction EF 35%,  PAF, presents for followup. \par \par Patient was last seen on 11/16/21 for leg pain.  I advised patient to get Venous Doppler and it showed no DVT.  I advised patient to try supplementing with Magnesium which would help with muscle cramps and also with constipation.  I advised patient to undergo an echocardiogram. \par \par She is on Simvastatin 20 mg for CAD.  She is on Eliquis 2.5 mg BID for stroke prevention.  She is on Lasix 20 mg for pleural effusion. \par \par Last echo was on 8/5/20 and it showed moderate LV systolic dysfunction EF 35% with a normal bioprosthetic mitral valve.

## 2022-03-09 NOTE — HISTORY OF PRESENT ILLNESS
[FreeTextEntry1] : 3/9/22 - Patient reports that she had ear discomfort when she took Eliquis twice daily. I advised patient to try resuming it twice daily to prevent stroke. Patient is feeling well. Patient reports that she takes Losartan and Digoxin as needed and skips if her BP gets below 100. 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 FU in 6 months. \par \par \par 11/16/21 - Patient reports leg pain and had received acupuncture treatments. I advised patient to get Venous Doppler. I advised patient to try supplementing with magnesium which would help with muscle cramps and also with constipation. I advised patient to undergo an echocardiogram. \par \par 7/27/21 - Patient called on 7/21/21 reporting that her HR was slow.  I advised patient to stop Digoxin 0.125 mg.  Patient reports that her HR is OK now and her BP is fine.  Patient denies CP, SOB, palpitations, or lightheadedness.  \par \par 6/2/21 - Patient has been stable.  Patient denies CP or SOB.  Patient reports occasional palpitations.  Patient denies lightheadedness.  Patient denies h/o syncope.  Her ECG showed NSR, first degree AVB, RBBB, LAPB.  I advised patient to start Losartan 25 mg for her LV dysfunction.  FU 6 months. \par \par 12/2/20 - Patient has been stable.  Patient denies CP, SOB, palpitations, or lightheadedness.  She is on Digoxin 0.125 mg QOD.  She is on Lasix 20 mg QD.  She is on Eliquis 2.5 mg BID for stroke prevention.  She is off ASA 81 mg.  Patient denies bleeding.  Patient reports that her BP at home is around 100-110 and her HR is around 70-80.  Her HR is elevated in office.  I advised patient to monitor her HR at home and if elevated she will contact me and I may increase Digoxin to QD.\par \par 8/5/20 - Patient feels well. She reports fast HR with exertion but is asymptomatic. Her BP is low. She is taking Metoprolol QOD. I advised patient that she may take it QD if HR is too fast. Patient denies CP. Patient denies SOB. She is not taking Aspirin due to easy bruising. She forgets to take Eliquis BID sometimes and not taking statins regularly. She is on Furosemide 20mg QD.  I advised patient to take cholesterol medications and blood thinner as prescribed. I advised patient to undergo an echocardiogram. Fu in 4 months.

## 2022-06-16 ENCOUNTER — RX RENEWAL (OUTPATIENT)
Age: 87
End: 2022-06-16

## 2022-09-20 ENCOUNTER — NON-APPOINTMENT (OUTPATIENT)
Age: 87
End: 2022-09-20

## 2022-09-20 ENCOUNTER — APPOINTMENT (OUTPATIENT)
Dept: CARDIOLOGY | Facility: CLINIC | Age: 87
End: 2022-09-20

## 2022-09-20 VITALS
DIASTOLIC BLOOD PRESSURE: 68 MMHG | RESPIRATION RATE: 18 BRPM | OXYGEN SATURATION: 97 % | SYSTOLIC BLOOD PRESSURE: 119 MMHG | HEART RATE: 92 BPM | WEIGHT: 124 LBS | TEMPERATURE: 97.1 F | BODY MASS INDEX: 23.43 KG/M2

## 2022-09-20 PROCEDURE — 93000 ELECTROCARDIOGRAM COMPLETE: CPT

## 2022-09-20 PROCEDURE — 99214 OFFICE O/P EST MOD 30 MIN: CPT | Mod: 25

## 2022-09-28 NOTE — HISTORY OF PRESENT ILLNESS
[FreeTextEntry1] : 9/20/22-  Patient is feeling well. Patient denies CP, SOB, palpitations, or lightheadedness. Patient recently stopped taking Lasix due to tinnitus and hair loss. She is on Simvastatin 20 mg for CAD.  She is on Eliquis 2.5 mg BID for stroke prevention.  She is on Losartan as needed. I advised patient to take Losartan 25 mg to prevent further cardiac hypertrophy.  Patient denies SOB while supine, she has trace edema. She wishes to get echocardiogram at the next visit. \par \par 3/9/22 - Patient reports that she had ear discomfort when she took Eliquis twice daily. I advised patient to try resuming it twice daily to prevent stroke. Patient is feeling well. Patient reports that she takes Losartan and Digoxin as needed and skips if her BP gets below 100. 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 FU in 6 months. \par \par 11/16/21 - Patient reports leg pain and had received acupuncture treatments. I advised patient to get Venous Doppler. I advised patient to try supplementing with magnesium which would help with muscle cramps and also with constipation. I advised patient to undergo an echocardiogram.  Patient underwent an echocardiogram and it showed moderate LV systolic dysfunction EF 35% with a normal bioprosthetic mitral valve. \par \par 7/27/21 - Patient called on 7/21/21 reporting that her HR was slow.  I advised patient to stop Digoxin 0.125 mg.  Patient reports that her HR is OK now and her BP is fine.  Patient denies CP, SOB, palpitations, or lightheadedness.  \par \par 6/2/21 - Patient has been stable.  Patient denies CP or SOB.  Patient reports occasional palpitations.  Patient denies lightheadedness.  Patient denies h/o syncope.  Her ECG showed NSR, first degree AVB, RBBB, LAPB.  I advised patient to start Losartan 25 mg for her LV dysfunction.  FU 6 months. \par \par 12/2/20 - Patient has been stable.  Patient denies CP, SOB, palpitations, or lightheadedness.  She is on Digoxin 0.125 mg QOD.  She is on Lasix 20 mg QD.  She is on Eliquis 2.5 mg BID for stroke prevention.  She is off ASA 81 mg.  Patient denies bleeding.  Patient reports that her BP at home is around 100-110 and her HR is around 70-80.  Her HR is elevated in office.  I advised patient to monitor her HR at home and if elevated she will contact me and I may increase Digoxin to QD.\par \par 8/5/20 - Patient feels well. She reports fast HR with exertion but is asymptomatic. Her BP is low. She is taking Metoprolol QOD. I advised patient that she may take it QD if HR is too fast. Patient denies CP. Patient denies SOB. She is not taking Aspirin due to easy bruising. She forgets to take Eliquis BID sometimes and not taking statins regularly. She is on Furosemide 20mg QD.  I advised patient to take cholesterol medications and blood thinner as prescribed. I advised patient to undergo an echocardiogram. Fu in 4 months.

## 2022-09-28 NOTE — REASON FOR VISIT
[FreeTextEntry1] : 88-year-old female with CAD s/p MI, severe MR s/p bioprosthetic MVR on 8/4/15, LV dysfunction EF 35%,  PAF, presents for followup. \par \par Patient was last seen on 3/9/22 for followup.  Patient reports that she takes Losartan and Digoxin as needed and skips if her BP gets below 100. \par \par She is on Simvastatin 20 mg for CAD.  She is on Eliquis 2.5 mg BID for stroke prevention.  She is on Lasix 20 mg for pleural effusion. \par \par Patient underwent an echocardiogram 11/16/21 and it showed moderate LV systolic dysfunction EF 35% with a normal bioprosthetic mitral valve.

## 2023-03-15 ENCOUNTER — APPOINTMENT (OUTPATIENT)
Dept: CARDIOLOGY | Facility: CLINIC | Age: 88
End: 2023-03-15
Payer: MEDICARE

## 2023-03-15 ENCOUNTER — NON-APPOINTMENT (OUTPATIENT)
Age: 88
End: 2023-03-15

## 2023-03-15 VITALS
BODY MASS INDEX: 23.81 KG/M2 | TEMPERATURE: 97.5 F | WEIGHT: 126 LBS | DIASTOLIC BLOOD PRESSURE: 65 MMHG | HEART RATE: 79 BPM | RESPIRATION RATE: 18 BRPM | SYSTOLIC BLOOD PRESSURE: 121 MMHG | OXYGEN SATURATION: 98 %

## 2023-03-15 PROCEDURE — 93000 ELECTROCARDIOGRAM COMPLETE: CPT

## 2023-03-15 PROCEDURE — 99214 OFFICE O/P EST MOD 30 MIN: CPT | Mod: 25

## 2023-03-15 PROCEDURE — 93306 TTE W/DOPPLER COMPLETE: CPT

## 2023-03-16 NOTE — REASON FOR VISIT
[FreeTextEntry1] : 89  year-old female with CAD s/p MI, severe MR s/p bioprosthetic MVR on 8/4/15, LV dysfunction EF 35%,  PAF, presents for followup. \par \par Patient was last seen on 9/20/22 for followup.  Patient was feeling well.  Patient recently stopped taking Lasix due to tinnitus and hair loss.  She is on Simvastatin 20 mg for CAD.  She is on Eliquis 2.5 mg BID for stroke prevention.  I advised patient to take Losartan 25 mg to prevent further cardiac hypertrophy.  She wished to get echocardiogram at the next visit. \par \par She is on Simvastatin 20 mg for CAD.  She is on Eliquis 2.5 mg BID for stroke prevention.  She is OFF Lasix 20 mg for pleural effusion. \par \par Patient underwent an echocardiogram 11/16/21 and it showed moderate LV systolic dysfunction EF 35% (6.6 cm/5.0 cm) with a normal bioprosthetic mitral valve. \par \par Patient underwent an echocardiogram and it showed moderate systolic LV dysfunction EF 35% (6.4 cm/5.1 cm) with a normal bioprosthetic mitral valve.

## 2023-03-16 NOTE — HISTORY OF PRESENT ILLNESS
[FreeTextEntry1] : 3/15/23 - Patient denies CP, SOB, palpitations, or lightheadedness. She is in A. Flutter. Patient has rales at base and trace edema. I advised patient to undergo an echocardiogram. I advised patient to increase dose of Simvastatin 40 mg \par \par 9/20/22-  Patient is feeling well. Patient denies CP, SOB, palpitations, or lightheadedness. Patient recently stopped taking Lasix due to tinnitus and hair loss. She is on Simvastatin 20 mg for CAD.  She is on Eliquis 2.5 mg BID for stroke prevention.  She is on Losartan as needed. I advised patient to take Losartan 25 mg to prevent further cardiac hypertrophy.  Patient denies SOB while supine, she has trace edema.  She wishes to get echocardiogram at the next visit. \par \par 3/9/22 - Patient reports that she had ear discomfort when she took Eliquis twice daily. I advised patient to try resuming it twice daily to prevent stroke. Patient is feeling well. Patient reports that she takes Losartan and Digoxin as needed and skips if her BP gets below 100. 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 FU in 6 months. \par \par 11/16/21 - Patient reports leg pain and had received acupuncture treatments. I advised patient to get Venous Doppler. I advised patient to try supplementing with magnesium which would help with muscle cramps and also with constipation. I advised patient to undergo an echocardiogram.  Patient underwent an echocardiogram and it showed moderate LV systolic dysfunction EF 35% with a normal bioprosthetic mitral valve. \par \par 7/27/21 - Patient called on 7/21/21 reporting that her HR was slow.  I advised patient to stop Digoxin 0.125 mg.  Patient reports that her HR is OK now and her BP is fine.  Patient denies CP, SOB, palpitations, or lightheadedness.  \par \par 6/2/21 - Patient has been stable.  Patient denies CP or SOB.  Patient reports occasional palpitations.  Patient denies lightheadedness.  Patient denies h/o syncope.  Her ECG showed NSR, first degree AVB, RBBB, LAPB.  I advised patient to start Losartan 25 mg for her LV dysfunction.  FU 6 months. \par \par 12/2/20 - Patient has been stable.  Patient denies CP, SOB, palpitations, or lightheadedness.  She is on Digoxin 0.125 mg QOD.  She is on Lasix 20 mg QD.  She is on Eliquis 2.5 mg BID for stroke prevention.  She is off ASA 81 mg.  Patient denies bleeding.  Patient reports that her BP at home is around 100-110 and her HR is around 70-80.  Her HR is elevated in office.  I advised patient to monitor her HR at home and if elevated she will contact me and I may increase Digoxin to QD.\par \par 8/5/20 - Patient feels well. She reports fast HR with exertion but is asymptomatic. Her BP is low. She is taking Metoprolol QOD. I advised patient that she may take it QD if HR is too fast. Patient denies CP. Patient denies SOB. She is not taking Aspirin due to easy bruising. She forgets to take Eliquis BID sometimes and not taking statins regularly. She is on Furosemide 20mg QD.  I advised patient to take cholesterol medications and blood thinner as prescribed. I advised patient to undergo an echocardiogram. Fu in 4 months.

## 2023-07-18 ENCOUNTER — NON-APPOINTMENT (OUTPATIENT)
Age: 88
End: 2023-07-18

## 2023-07-21 ENCOUNTER — APPOINTMENT (OUTPATIENT)
Dept: CARDIOLOGY | Facility: CLINIC | Age: 88
End: 2023-07-21
Payer: MEDICARE

## 2023-07-21 ENCOUNTER — NON-APPOINTMENT (OUTPATIENT)
Age: 88
End: 2023-07-21

## 2023-07-21 VITALS
WEIGHT: 122 LBS | DIASTOLIC BLOOD PRESSURE: 58 MMHG | RESPIRATION RATE: 16 BRPM | OXYGEN SATURATION: 98 % | SYSTOLIC BLOOD PRESSURE: 117 MMHG | BODY MASS INDEX: 23.05 KG/M2 | HEART RATE: 51 BPM | TEMPERATURE: 96.8 F

## 2023-07-21 VITALS — SYSTOLIC BLOOD PRESSURE: 108 MMHG | DIASTOLIC BLOOD PRESSURE: 48 MMHG

## 2023-07-21 PROCEDURE — 93000 ELECTROCARDIOGRAM COMPLETE: CPT

## 2023-07-21 PROCEDURE — 99214 OFFICE O/P EST MOD 30 MIN: CPT | Mod: 25

## 2023-08-11 ENCOUNTER — APPOINTMENT (OUTPATIENT)
Dept: CARDIOLOGY | Facility: CLINIC | Age: 88
End: 2023-08-11
Payer: MEDICARE

## 2023-08-11 ENCOUNTER — NON-APPOINTMENT (OUTPATIENT)
Age: 88
End: 2023-08-11

## 2023-08-11 VITALS
WEIGHT: 120 LBS | DIASTOLIC BLOOD PRESSURE: 65 MMHG | TEMPERATURE: 96.8 F | OXYGEN SATURATION: 94 % | RESPIRATION RATE: 16 BRPM | SYSTOLIC BLOOD PRESSURE: 119 MMHG | BODY MASS INDEX: 22.67 KG/M2 | HEART RATE: 56 BPM

## 2023-08-11 DIAGNOSIS — I25.5 ISCHEMIC CARDIOMYOPATHY: ICD-10-CM

## 2023-08-11 DIAGNOSIS — R60.0 LOCALIZED EDEMA: ICD-10-CM

## 2023-08-11 PROCEDURE — 99214 OFFICE O/P EST MOD 30 MIN: CPT | Mod: 25

## 2023-08-11 PROCEDURE — 93000 ELECTROCARDIOGRAM COMPLETE: CPT

## 2023-08-11 NOTE — HISTORY OF PRESENT ILLNESS
[FreeTextEntry1] : She reports SOB on exertion over the past few weeks. She restarted Lasix 20mg PO daily with some improvement. She has no CP, headache, fatigue, dizziness, palpitations, or syncope. She was told by visiting nurse yesterday that she has LE edema and crackles.   7/21/23 - Patient reports worsening BP 80-90s yesterday with HR 40s. She denies dizziness or fatigue. She feels colder than usual. She is on Digoxin 0.125 mg every 3 days. She is on Losartan 25 mg. She is on Eliquis 2.5 mg BID. No s/s of bleeding. I advised patient to stop Digoxin 0.125 mg for bradycardia. She may need a PPM if she has recurrent PAF and needs a AV annika blocker, or if she has symptomatic bradycardia..  3/15/23 - Patient denies CP, SOB, palpitations, or lightheadedness. She is in A. Flutter. Patient has rales at base and trace edema. I advised patient to undergo an echocardiogram. I advised patient to increase dose of Simvastatin 40 mg  9/20/22- Patient is feeling well. Patient denies CP, SOB, palpitations, or lightheadedness. Patient recently stopped taking Lasix due to tinnitus and hair loss. She is on Simvastatin 20 mg for CAD. She is on Eliquis 2.5 mg BID for stroke prevention. She is on Losartan as needed. I advised patient to take Losartan 25 mg to prevent further cardiac hypertrophy. Patient denies SOB while supine, she has trace edema. She wishes to get echocardiogram at the next visit.  3/9/22 - Patient reports that she had ear discomfort when she took Eliquis twice daily. I advised patient to try resuming it twice daily to prevent stroke. Patient is feeling well. Patient reports that she takes Losartan and Digoxin as needed and skips if her BP gets below 100. 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 FU in 6 months.  11/16/21 - Patient reports leg pain and had received acupuncture treatments. I advised patient to get Venous Doppler. I advised patient to try supplementing with magnesium which would help with muscle cramps and also with constipation. I advised patient to undergo an echocardiogram. Patient underwent an echocardiogram and it showed moderate LV systolic dysfunction EF 35% with a normal bioprosthetic mitral valve.  7/27/21 - Patient called on 7/21/21 reporting that her HR was slow. I advised patient to stop Digoxin 0.125 mg. Patient reports that her HR is OK now and her BP is fine. Patient denies CP, SOB, palpitations, or lightheadedness.  6/2/21 - Patient has been stable. Patient denies CP or SOB. Patient reports occasional palpitations. Patient denies lightheadedness. Patient denies h/o syncope. Her ECG showed NSR, first degree AVB, RBBB, LAPB. I advised patient to start Losartan 25 mg for her LV dysfunction. FU 6 months.  12/2/20 - Patient has been stable. Patient denies CP, SOB, palpitations, or lightheadedness. She is on Digoxin 0.125 mg QOD. She is on Lasix 20 mg QD. She is on Eliquis 2.5 mg BID for stroke prevention. She is off ASA 81 mg. Patient denies bleeding. Patient reports that her BP at home is around 100-110 and her HR is around 70-80. Her HR is elevated in office. I advised patient to monitor her HR at home and if elevated she will contact me and I may increase Digoxin to QD.  8/5/20 - Patient feels well. She reports fast HR with exertion but is asymptomatic. Her BP is low. She is taking Metoprolol QOD. I advised patient that she may take it QD if HR is too fast. Patient denies CP. Patient denies SOB. She is not taking Aspirin due to easy bruising. She forgets to take Eliquis BID sometimes and not taking statins regularly. She is on Furosemide 20mg QD. I advised patient to take cholesterol medications and blood thinner as prescribed. I advised patient to undergo an echocardiogram. Fu in 4 months.

## 2023-08-11 NOTE — HISTORY OF PRESENT ILLNESS
[FreeTextEntry1] : 7/21/23 - Patient reports worsening BP 80-90s yesterday with HR 40s. She denies dizziness or fatigue. She feels colder than usual. She is on Digoxin 0.125 mg every 3 days. She is on Losartan 25 mg. She is on Eliquis 2.5 mg BID. No s/s of bleeding.  I advised patient to stop Digoxin 0.125 mg for bradycardia.  She may need a PPM if she has recurrent PAF and needs a AV annika blocker, or if she has symptomatic bradycardia..  3/15/23 - Patient denies CP, SOB, palpitations, or lightheadedness. She is in A. Flutter. Patient has rales at base and trace edema. I advised patient to undergo an echocardiogram. I advised patient to increase dose of Simvastatin 40 mg   9/20/22-  Patient is feeling well. Patient denies CP, SOB, palpitations, or lightheadedness. Patient recently stopped taking Lasix due to tinnitus and hair loss. She is on Simvastatin 20 mg for CAD.  She is on Eliquis 2.5 mg BID for stroke prevention.  She is on Losartan as needed. I advised patient to take Losartan 25 mg to prevent further cardiac hypertrophy.  Patient denies SOB while supine, she has trace edema.  She wishes to get echocardiogram at the next visit.   3/9/22 - Patient reports that she had ear discomfort when she took Eliquis twice daily. I advised patient to try resuming it twice daily to prevent stroke. Patient is feeling well. Patient reports that she takes Losartan and Digoxin as needed and skips if her BP gets below 100. 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 FU in 6 months.   11/16/21 - Patient reports leg pain and had received acupuncture treatments. I advised patient to get Venous Doppler. I advised patient to try supplementing with magnesium which would help with muscle cramps and also with constipation. I advised patient to undergo an echocardiogram.  Patient underwent an echocardiogram and it showed moderate LV systolic dysfunction EF 35% with a normal bioprosthetic mitral valve.   7/27/21 - Patient called on 7/21/21 reporting that her HR was slow.  I advised patient to stop Digoxin 0.125 mg.  Patient reports that her HR is OK now and her BP is fine.  Patient denies CP, SOB, palpitations, or lightheadedness.    6/2/21 - Patient has been stable.  Patient denies CP or SOB.  Patient reports occasional palpitations.  Patient denies lightheadedness.  Patient denies h/o syncope.  Her ECG showed NSR, first degree AVB, RBBB, LAPB.  I advised patient to start Losartan 25 mg for her LV dysfunction.  FU 6 months.   12/2/20 - Patient has been stable.  Patient denies CP, SOB, palpitations, or lightheadedness.  She is on Digoxin 0.125 mg QOD.  She is on Lasix 20 mg QD.  She is on Eliquis 2.5 mg BID for stroke prevention.  She is off ASA 81 mg.  Patient denies bleeding.  Patient reports that her BP at home is around 100-110 and her HR is around 70-80.  Her HR is elevated in office.  I advised patient to monitor her HR at home and if elevated she will contact me and I may increase Digoxin to QD.  8/5/20 - Patient feels well. She reports fast HR with exertion but is asymptomatic. Her BP is low. She is taking Metoprolol QOD. I advised patient that she may take it QD if HR is too fast. Patient denies CP. Patient denies SOB. She is not taking Aspirin due to easy bruising. She forgets to take Eliquis BID sometimes and not taking statins regularly. She is on Furosemide 20mg QD.  I advised patient to take cholesterol medications and blood thinner as prescribed. I advised patient to undergo an echocardiogram. Fu in 4 months.  Mirvaso Counseling: Mirvaso is a topical medication which can decrease superficial blood flow where applied. Side effects are uncommon and include stinging, redness and allergic reactions.

## 2023-08-11 NOTE — ASSESSMENT
[FreeTextEntry1] : 89 year-old female with CAD s/p MI, severe MR s/p bioprosthetic MVR on 8/4/15, LV dysfunction EF 35%, PAF, presents for followup.  Last seen 7/21/23 by Dr. Pineda for hypotension and slow HR. She was told to stop digoxin and that she may need a PPM if she has symptomatic ann-marie.  She is on Simvastatin 40 mg for CAD. She is on Eliquis 2.5 mg BID for stroke prevention. She is OFF Digoxin 0.125 mg every 3 days for rate control. She is on Losartan 25 mg for HFrEF. She restarted Lasix 20 mg 1 week ago for pleural effusion.  Patient underwent an echocardiogram 3/15/23 and it showed moderate LV systolic dysfunction EF 35-40%, mildly elevated transmitral gradient (6 mmHg).  She reports SOB on exertion over the past few weeks. She restarted Lasix 20mg PO daily with some improvement. She has no CP, headache, fatigue, dizziness, palpitations, or syncope. She was told by visiting nurse yesterday that she has LE edema and crackles.   1) SOB on exertion 2) HFrEF due to ICM 3) s/p MVR - Appears volume overloaded on exam - on losartan 25mg daily - I advised pt to increase Lasix to 40mg PO daily and reassess next week - Check CXR to reassess pleural effusion  4) pAF - on Eliquis - HRs in 50s today - no dizziness or fatigue - continue to monitor  5) CAD - on simvastatin  6) Follow-up, next week with Dr. Pineda to reassess symptoms and volume status

## 2023-08-11 NOTE — REASON FOR VISIT
[FreeTextEntry1] : 89  year-old female with CAD s/p MI, severe MR s/p bioprosthetic MVR on 8/4/15, LV dysfunction EF 35%,  PAF, presents for followup. \par \par Patient was last seen on 3/15/23 - Patient denies CP, SOB, palpitations, or lightheadedness. She is in A. Flutter. Patient has rales at base and trace edema. I advised patient to undergo an echocardiogram. I advised patient to increase dose of Simvastatin 40 mg   Patient underwent an echocardiogram and it showed moderate systolic LV dysfunction EF 35-40% (6.2/4.5), marked LVE and LVH, bioprosthetic mitral valve with mean transmitral gradient of 6 mmHg, mild pulmonary hypertension (44 mmHg).   \par \par She is on Simvastatin 40 mg for CAD.  She is on Eliquis 2.5 mg BID for stroke prevention.  She is on Digoxin 0.125 mg every 3 days for rate control.  She is on Losartan 25 mg for HFrEF.  She is on She is OFF Lasix 20 mg for pleural effusion. \par \par Patient underwent an echocardiogram 11/16/21 and it showed moderate LV systolic dysfunction EF 35% (6.6 cm/5.0 cm) with a normal bioprosthetic mitral valve. \par \par Patient underwent an echocardiogram 8/5/20 and it showed moderate systolic LV dysfunction EF 35% (6.4 cm/5.1 cm) with a normal bioprosthetic mitral valve. \par \par Patient underwent an echocardiogram 6/12/19 and it showed moderate systolic LV dysfunction EF 35% (6.4 cm/5.0 cm) with a normal bioprosthetic mitral valve.

## 2023-08-11 NOTE — REASON FOR VISIT
[FreeTextEntry1] : 89 year-old female with CAD s/p MI, severe MR s/p bioprosthetic MVR on 8/4/15, LV dysfunction EF 35%, PAF, presents for followup.  Last seen 7/21/23 by Dr. Pineda for hypotension and slow HR. She was told to stop digoxin and that she may need a PPM if she has symptomatic ann-marie.  She is on Simvastatin 40 mg for CAD. She is on Eliquis 2.5 mg BID for stroke prevention. She is OFF Digoxin 0.125 mg every 3 days for rate control. She is on Losartan 25 mg for HFrEF. She restarted Lasix 20 mg 1 week ago for pleural effusion.  Patient underwent an echocardiogram 3/15/23 and it showed moderate LV systolic dysfunction EF 35-40%, mildly elevated transmitral gradient (6 mmHg).  Patient underwent an echocardiogram 11/16/21 and it showed moderate LV systolic dysfunction EF 35% (6.6 cm/5.0 cm) with a normal bioprosthetic mitral valve.  Patient underwent an echocardiogram 8/5/20 and it showed moderate systolic LV dysfunction EF 35% (6.4 cm/5.1 cm) with a normal bioprosthetic mitral valve.  Patient underwent an echocardiogram 6/12/19 and it showed moderate systolic LV dysfunction EF 35% (6.4 cm/5.0 cm) with a normal bioprosthetic mitral valve.

## 2023-08-11 NOTE — PHYSICAL EXAM
[Well Developed] : well developed [Well Nourished] : well nourished [No Acute Distress] : no acute distress [Normal Conjunctiva] : normal conjunctiva [No Carotid Bruit] : no carotid bruit [Normal S1, S2] : normal S1, S2 [No Murmur] : no murmur [No Rub] : no rub [No Gallop] : no gallop [Good Air Entry] : good air entry [No Respiratory Distress] : no respiratory distress  [Soft] : abdomen soft [Non Tender] : non-tender [No Masses/organomegaly] : no masses/organomegaly [Normal Bowel Sounds] : normal bowel sounds [Normal Gait] : normal gait [No Cyanosis] : no cyanosis [No Clubbing] : no clubbing [No Varicosities] : no varicosities [No Rash] : no rash [No Skin Lesions] : no skin lesions [Moves all extremities] : moves all extremities [No Focal Deficits] : no focal deficits [Normal Speech] : normal speech [Alert and Oriented] : alert and oriented [Normal memory] : normal memory [de-identified] : mildly elevated JVD [de-identified] : Irregular, bradycardic [de-identified] : Decreased BS mid-way up on left side and at the base on R side. Bibasilar crackles [de-identified] : Trace LE edema

## 2023-08-16 NOTE — PHYSICAL EXAM
[Well Developed] : well developed [Well Nourished] : well nourished [No Acute Distress] : no acute distress [Normal Conjunctiva] : normal conjunctiva [No Carotid Bruit] : no carotid bruit [Normal S1, S2] : normal S1, S2 [No Murmur] : no murmur [No Rub] : no rub [No Gallop] : no gallop [Good Air Entry] : good air entry [No Respiratory Distress] : no respiratory distress  [Soft] : abdomen soft [Non Tender] : non-tender [No Masses/organomegaly] : no masses/organomegaly [Normal Bowel Sounds] : normal bowel sounds [No Cyanosis] : no cyanosis [Normal Gait] : normal gait [No Varicosities] : no varicosities [No Clubbing] : no clubbing [No Rash] : no rash [No Skin Lesions] : no skin lesions [Moves all extremities] : moves all extremities [No Focal Deficits] : no focal deficits [Normal Speech] : normal speech [Alert and Oriented] : alert and oriented [Normal memory] : normal memory [de-identified] : mildly elevated JVD [de-identified] : Irregular, bradycardic [de-identified] : Decreased BS mid-way up on left side and at the base on R side. Bibasilar crackles [de-identified] : Trace LE edema

## 2023-08-16 NOTE — REVIEW OF SYSTEMS
[SOB] : shortness of breath [Dyspnea on exertion] : dyspnea during exertion [Chest Discomfort] : no chest discomfort [Lower Ext Edema] : lower extremity edema [Palpitations] : no palpitations [Leg Claudication] : no intermittent leg claudication [Orthopnea] : no orthopnea [PND] : no PND [Syncope] : no syncope [Negative] : Heme/Lymph

## 2023-08-16 NOTE — HISTORY OF PRESENT ILLNESS
[FreeTextEntry1] : 8/11/23 with Dr. Mendez - She reports SOB on exertion over the past few weeks. She restarted Lasix 20mg PO daily with some improvement. She has no CP, headache, fatigue, dizziness, palpitations, or syncope. She was told by visiting nurse yesterday that she has LE edema and crackles.   7/21/23 - Patient reports worsening BP 80-90s yesterday with HR 40s. She denies dizziness or fatigue. She feels colder than usual. She is on Digoxin 0.125 mg every 3 days. She is on Losartan 25 mg. She is on Eliquis 2.5 mg BID. No s/s of bleeding. I advised patient to stop Digoxin 0.125 mg for bradycardia. She may need a PPM if she has recurrent PAF and needs a AV annika blocker, or if she has symptomatic bradycardia..  3/15/23 - Patient denies CP, SOB, palpitations, or lightheadedness. She is in A. Flutter. Patient has rales at base and trace edema. I advised patient to undergo an echocardiogram. I advised patient to increase dose of Simvastatin 40 mg  9/20/22- Patient is feeling well. Patient denies CP, SOB, palpitations, or lightheadedness. Patient recently stopped taking Lasix due to tinnitus and hair loss. She is on Simvastatin 20 mg for CAD. She is on Eliquis 2.5 mg BID for stroke prevention. She is on Losartan as needed. I advised patient to take Losartan 25 mg to prevent further cardiac hypertrophy. Patient denies SOB while supine, she has trace edema. She wishes to get echocardiogram at the next visit.  3/9/22 - Patient reports that she had ear discomfort when she took Eliquis twice daily. I advised patient to try resuming it twice daily to prevent stroke. Patient is feeling well. Patient reports that she takes Losartan and Digoxin as needed and skips if her BP gets below 100. 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 FU in 6 months.  11/16/21 - Patient reports leg pain and had received acupuncture treatments. I advised patient to get Venous Doppler. I advised patient to try supplementing with magnesium which would help with muscle cramps and also with constipation. I advised patient to undergo an echocardiogram. Patient underwent an echocardiogram and it showed moderate LV systolic dysfunction EF 35% with a normal bioprosthetic mitral valve.  7/27/21 - Patient called on 7/21/21 reporting that her HR was slow. I advised patient to stop Digoxin 0.125 mg. Patient reports that her HR is OK now and her BP is fine. Patient denies CP, SOB, palpitations, or lightheadedness.  6/2/21 - Patient has been stable. Patient denies CP or SOB. Patient reports occasional palpitations. Patient denies lightheadedness. Patient denies h/o syncope. Her ECG showed NSR, first degree AVB, RBBB, LAPB. I advised patient to start Losartan 25 mg for her LV dysfunction. FU 6 months.  12/2/20 - Patient has been stable. Patient denies CP, SOB, palpitations, or lightheadedness. She is on Digoxin 0.125 mg QOD. She is on Lasix 20 mg QD. She is on Eliquis 2.5 mg BID for stroke prevention. She is off ASA 81 mg. Patient denies bleeding. Patient reports that her BP at home is around 100-110 and her HR is around 70-80. Her HR is elevated in office. I advised patient to monitor her HR at home and if elevated she will contact me and I may increase Digoxin to QD.  8/5/20 - Patient feels well. She reports fast HR with exertion but is asymptomatic. Her BP is low. She is taking Metoprolol QOD. I advised patient that she may take it QD if HR is too fast. Patient denies CP. Patient denies SOB. She is not taking Aspirin due to easy bruising. She forgets to take Eliquis BID sometimes and not taking statins regularly. She is on Furosemide 20mg QD. I advised patient to take cholesterol medications and blood thinner as prescribed. I advised patient to undergo an echocardiogram. Fu in 4 months.

## 2023-08-16 NOTE — REASON FOR VISIT
[FreeTextEntry1] : 89 year-old female with CAD s/p MI, severe MR s/p bioprosthetic MVR on 8/4/15, LV dysfunction EF 35%, PAF, presents for followup.  Last seen 8/11/23 by Dr. Mendez - She reports SOB on exertion over the past few weeks. She restarted Lasix 20mg PO daily with some improvement. She has no CP, headache, fatigue, dizziness, palpitations, or syncope. She was told by visiting nurse yesterday that she has LE edema and crackles.  Dr. Mendez advised pt to increase Lasix to 40 mg QD.  She is on Simvastatin 40 mg for CAD. She is on Eliquis 2.5 mg BID for stroke prevention. She is OFF Digoxin 0.125 mg.  She is on Losartan 25 mg for HFrEF. She is on Lasix 40 mg.  Patient underwent an echocardiogram 3/15/23 and it showed moderate LV systolic dysfunction EF 35-40%, mildly elevated transmitral gradient (6 mmHg).  Patient underwent an echocardiogram 11/16/21 and it showed moderate LV systolic dysfunction EF 35% (6.6 cm/5.0 cm) with a normal bioprosthetic mitral valve.  Patient underwent an echocardiogram 8/5/20 and it showed moderate systolic LV dysfunction EF 35% (6.4 cm/5.1 cm) with a normal bioprosthetic mitral valve.  Patient underwent an echocardiogram 6/12/19 and it showed moderate systolic LV dysfunction EF 35% (6.4 cm/5.0 cm) with a normal bioprosthetic mitral valve.

## 2023-08-18 ENCOUNTER — APPOINTMENT (OUTPATIENT)
Dept: CARDIOLOGY | Facility: CLINIC | Age: 88
End: 2023-08-18
Payer: MEDICARE

## 2023-08-18 VITALS
BODY MASS INDEX: 22.48 KG/M2 | WEIGHT: 119 LBS | SYSTOLIC BLOOD PRESSURE: 117 MMHG | HEART RATE: 55 BPM | RESPIRATION RATE: 16 BRPM | TEMPERATURE: 97.8 F | DIASTOLIC BLOOD PRESSURE: 53 MMHG | OXYGEN SATURATION: 99 %

## 2023-08-18 DIAGNOSIS — R06.02 SHORTNESS OF BREATH: ICD-10-CM

## 2023-08-18 DIAGNOSIS — I48.0 PAROXYSMAL ATRIAL FIBRILLATION: ICD-10-CM

## 2023-08-18 PROCEDURE — 99213 OFFICE O/P EST LOW 20 MIN: CPT

## 2023-08-30 PROBLEM — I48.0 PAROXYSMAL ATRIAL FIBRILLATION: Status: ACTIVE | Noted: 2019-06-29

## 2023-09-19 ENCOUNTER — APPOINTMENT (OUTPATIENT)
Dept: CARDIOLOGY | Facility: CLINIC | Age: 88
End: 2023-09-19
Payer: MEDICARE

## 2023-09-19 VITALS
HEART RATE: 87 BPM | BODY MASS INDEX: 22.86 KG/M2 | SYSTOLIC BLOOD PRESSURE: 136 MMHG | RESPIRATION RATE: 18 BRPM | WEIGHT: 121 LBS | TEMPERATURE: 97.5 F | OXYGEN SATURATION: 100 % | DIASTOLIC BLOOD PRESSURE: 77 MMHG

## 2023-09-19 PROCEDURE — 99213 OFFICE O/P EST LOW 20 MIN: CPT

## 2023-10-12 LAB
ALBUMIN SERPL ELPH-MCNC: 3.8 G/DL
ALP BLD-CCNC: 102 U/L
ALT SERPL-CCNC: 12 U/L
ANION GAP SERPL CALC-SCNC: 13 MMOL/L
AST SERPL-CCNC: 36 U/L
BILIRUB SERPL-MCNC: 0.5 MG/DL
BUN SERPL-MCNC: 56 MG/DL
CALCIUM SERPL-MCNC: 9.5 MG/DL
CHLORIDE SERPL-SCNC: 102 MMOL/L
CO2 SERPL-SCNC: 26 MMOL/L
CREAT SERPL-MCNC: 2.11 MG/DL
EGFR: 22 ML/MIN/1.73M2
GLUCOSE SERPL-MCNC: 87 MG/DL
NT-PROBNP SERPL-MCNC: 2957 PG/ML
POTASSIUM SERPL-SCNC: 4.4 MMOL/L
PROT SERPL-MCNC: 8.1 G/DL
SODIUM SERPL-SCNC: 141 MMOL/L

## 2023-12-16 RX ORDER — LOSARTAN POTASSIUM 25 MG/1
25 TABLET, FILM COATED ORAL
Qty: 90 | Refills: 1 | Status: DISCONTINUED | COMMUNITY
Start: 2021-06-02 | End: 2023-12-16

## 2023-12-20 ENCOUNTER — APPOINTMENT (OUTPATIENT)
Dept: CARDIOLOGY | Facility: CLINIC | Age: 88
End: 2023-12-20
Payer: MEDICARE

## 2023-12-20 VITALS
DIASTOLIC BLOOD PRESSURE: 67 MMHG | OXYGEN SATURATION: 99 % | BODY MASS INDEX: 22.86 KG/M2 | HEART RATE: 89 BPM | WEIGHT: 121 LBS | SYSTOLIC BLOOD PRESSURE: 107 MMHG | TEMPERATURE: 96.8 F | RESPIRATION RATE: 16 BRPM

## 2023-12-20 PROCEDURE — 99213 OFFICE O/P EST LOW 20 MIN: CPT

## 2023-12-20 RX ORDER — DIGOXIN 125 UG/1
125 TABLET ORAL
Qty: 45 | Refills: 3 | Status: DISCONTINUED | COMMUNITY
Start: 2020-08-05 | End: 2023-12-20

## 2023-12-20 NOTE — PHYSICAL EXAM
[Well Developed] : well developed [Well Nourished] : well nourished [No Acute Distress] : no acute distress [Normal Conjunctiva] : normal conjunctiva [No Carotid Bruit] : no carotid bruit [Normal S1, S2] : normal S1, S2 [No Murmur] : no murmur [No Rub] : no rub [No Gallop] : no gallop [Good Air Entry] : good air entry [No Respiratory Distress] : no respiratory distress  [Soft] : abdomen soft [Non Tender] : non-tender [No Masses/organomegaly] : no masses/organomegaly [Normal Bowel Sounds] : normal bowel sounds [Normal Gait] : normal gait [No Cyanosis] : no cyanosis [No Clubbing] : no clubbing [No Varicosities] : no varicosities [No Rash] : no rash [No Skin Lesions] : no skin lesions [Moves all extremities] : moves all extremities [No Focal Deficits] : no focal deficits [Normal Speech] : normal speech [Alert and Oriented] : alert and oriented [Normal memory] : normal memory [de-identified] : mildly elevated JVD [de-identified] : Irregular, bradycardic [de-identified] : Decreased BS mid-way up on left side and at the base on R side. Bibasilar crackles [de-identified] : Trace LE edema

## 2023-12-20 NOTE — HISTORY OF PRESENT ILLNESS
[FreeTextEntry1] : 12/20/23 - Patient has been stable.  Patient denies CP or SOB.  Patient denies LE edema.  Recent .  She is on Simvastatin 40 mg for CAD. She is on Eliquis 2.5 mg BID for stroke prevention.  She is on Jardiance 10 mg, Entresto 24-26 mg (taking QD only) for HFrEF.  She is OFF Lasix 20 mg.  I advised patient to switch Simvastatin 40 mg to Atorvastatin 40 mg.  Patient is reluctant but she will consider.  FU 3 months.   (1) PAF -  Her [id number] score is 4 (age>75, CAD, female gender).  She should continue Eliquis 2.5 mg BID for stroke prevention.  (2) CAD s/p MI, HFrEF 35% - Patient is tolerating Jardiance 10 mg and Entresto 24-26 mg (taking QD only).  Recent .   I advised patient to switch Simvastatin 40 mg to Atorvastatin 40 mg.  Patient is reluctant but she will consider.    (3) Severe MR s/p bioprosthetic MVR on 8/4/15 - Patient is stable.   (4) Followup - 3 months.  9/19/23 - Patient has been stable.  She is tolerating Jardiance 10 mg.  Patient reports one episode of  without palpitations.  Patient denies CP, SOB, or palpitations.  Patient denies LE edema.  She is on Simvastatin 40 mg for CAD. She is on Eliquis 2.5 mg BID for stroke prevention. She is OFF Digoxin 0.125 mg.  She is on Jardiance 10 mg, Lasix 20 mg, Losartan 25 mg for HFrEF.  I advised patient to switch Losartan 25 mg to Entresto 24-26 mg BID.  8/18/23 - Patient reports that last week visiting nurse noted LE edema.  She saw Dr. Mendez and Lasix was increased to 40 mg QD.  CXR showed small left pleural effusion.  ProBNP was elevated at 2957.  I advised patient to start Jardiance 10 mg for HFrEF.  I will consider switching Losartan 25 mg to Entresto.  FU 1 month.   8/11/23 with Dr. Mendez - She reports SOB on exertion over the past few weeks. She restarted Lasix 20mg PO daily with some improvement. She has no CP, headache, fatigue, dizziness, palpitations, or syncope. She was told by visiting nurse yesterday that she has LE edema and crackles.  Dr. Mendez advised pt to increase Lasix to 40 mg QD and have a CXR.  7/21/23 - Patient reports worsening BP 80-90s yesterday with HR 40s. She denies dizziness or fatigue. She feels colder than usual. She is on Digoxin 0.125 mg every 3 days. She is on Losartan 25 mg. She is on Eliquis 2.5 mg BID. No s/s of bleeding. I advised patient to stop Digoxin 0.125 mg for bradycardia. She may need a PPM if she has recurrent PAF and needs a AV annika blocker, or if she has symptomatic bradycardia..  3/15/23 - Patient denies CP, SOB, palpitations, or lightheadedness. She is in A. Flutter. Patient has rales at base and trace edema. I advised patient to undergo an echocardiogram. I advised patient to increase dose of Simvastatin 40 mg  9/20/22- Patient is feeling well. Patient denies CP, SOB, palpitations, or lightheadedness. Patient recently stopped taking Lasix due to tinnitus and hair loss. She is on Simvastatin 20 mg for CAD. She is on Eliquis 2.5 mg BID for stroke prevention. She is on Losartan as needed. I advised patient to take Losartan 25 mg to prevent further cardiac hypertrophy. Patient denies SOB while supine, she has trace edema. She wishes to get echocardiogram at the next visit.  3/9/22 - Patient reports that she had ear discomfort when she took Eliquis twice daily. I advised patient to try resuming it twice daily to prevent stroke. Patient is feeling well. Patient reports that she takes Losartan and Digoxin as needed and skips if her BP gets below 100. 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 FU in 6 months.  11/16/21 - Patient reports leg pain and had received acupuncture treatments. I advised patient to get Venous Doppler. I advised patient to try supplementing with magnesium which would help with muscle cramps and also with constipation. I advised patient to undergo an echocardiogram. Patient underwent an echocardiogram and it showed moderate LV systolic dysfunction EF 35% with a normal bioprosthetic mitral valve.  7/27/21 - Patient called on 7/21/21 reporting that her HR was slow. I advised patient to stop Digoxin 0.125 mg. Patient reports that her HR is OK now and her BP is fine. Patient denies CP, SOB, palpitations, or lightheadedness.  6/2/21 - Patient has been stable. Patient denies CP or SOB. Patient reports occasional palpitations. Patient denies lightheadedness. Patient denies h/o syncope. Her ECG showed NSR, first degree AVB, RBBB, LAPB. I advised patient to start Losartan 25 mg for her LV dysfunction. FU 6 months.  12/2/20 - Patient has been stable. Patient denies CP, SOB, palpitations, or lightheadedness. She is on Digoxin 0.125 mg QOD. She is on Lasix 20 mg QD. She is on Eliquis 2.5 mg BID for stroke prevention. She is off ASA 81 mg. Patient denies bleeding. Patient reports that her BP at home is around 100-110 and her HR is around 70-80. Her HR is elevated in office. I advised patient to monitor her HR at home and if elevated she will contact me and I may increase Digoxin to QD.  8/5/20 - Patient feels well. She reports fast HR with exertion but is asymptomatic. Her BP is low. She is taking Metoprolol QOD. I advised patient that she may take it QD if HR is too fast. Patient denies CP. Patient denies SOB. She is not taking Aspirin due to easy bruising. She forgets to take Eliquis BID sometimes and not taking statins regularly. She is on Furosemide 20mg QD. I advised patient to take cholesterol medications and blood thinner as prescribed. I advised patient to undergo an echocardiogram. Fu in 4 months.

## 2024-03-16 PROBLEM — Z95.2 S/P MVR (MITRAL VALVE REPLACEMENT): Status: ACTIVE | Noted: 2019-06-29

## 2024-03-16 PROBLEM — I50.20 HFREF (HEART FAILURE WITH REDUCED EJECTION FRACTION): Status: ACTIVE | Noted: 2023-08-18

## 2024-03-19 ENCOUNTER — APPOINTMENT (OUTPATIENT)
Dept: CARDIOLOGY | Facility: CLINIC | Age: 89
End: 2024-03-19
Payer: MEDICARE

## 2024-03-19 VITALS
DIASTOLIC BLOOD PRESSURE: 68 MMHG | SYSTOLIC BLOOD PRESSURE: 109 MMHG | OXYGEN SATURATION: 100 % | RESPIRATION RATE: 18 BRPM | BODY MASS INDEX: 23.05 KG/M2 | WEIGHT: 122 LBS | HEART RATE: 87 BPM

## 2024-03-19 DIAGNOSIS — I48.91 UNSPECIFIED ATRIAL FIBRILLATION: ICD-10-CM

## 2024-03-19 DIAGNOSIS — Z95.2 PRESENCE OF PROSTHETIC HEART VALVE: ICD-10-CM

## 2024-03-19 DIAGNOSIS — I50.20 UNSPECIFIED SYSTOLIC (CONGESTIVE) HEART FAILURE: ICD-10-CM

## 2024-03-19 DIAGNOSIS — I25.10 ATHEROSCLEROTIC HEART DISEASE OF NATIVE CORONARY ARTERY W/OUT ANGINA PECTORIS: ICD-10-CM

## 2024-03-19 PROCEDURE — 99213 OFFICE O/P EST LOW 20 MIN: CPT

## 2024-03-30 NOTE — HISTORY OF PRESENT ILLNESS
[FreeTextEntry1] : 3/19/24 - Patient has been stable.  Patient denies CP, SOB, palpitations, or lightheadedness.  /68 HR 87.  Exam unremarkable.  I advised patient to continue current medications.  FU 6 months.   12/20/23 - Patient has been stable.  Patient denies CP or SOB.  Patient denies LE edema.  Recent .  She is on Simvastatin 40 mg for CAD. She is on Eliquis 2.5 mg BID for stroke prevention.  She is on Jardiance 10 mg, Entresto 24-26 mg (taking QD only) for HFrEF.  She is OFF Lasix 20 mg.  I advised patient to switch Simvastatin 40 mg to Atorvastatin 40 mg.  Patient is reluctant but she will consider.  FU 3 months.   9/19/23 - Patient has been stable.  She is tolerating Jardiance 10 mg.  Patient reports one episode of  without palpitations.  Patient denies CP, SOB, or palpitations.  Patient denies LE edema.  She is on Simvastatin 40 mg for CAD. She is on Eliquis 2.5 mg BID for stroke prevention. She is OFF Digoxin 0.125 mg.  She is on Jardiance 10 mg, Lasix 20 mg, Losartan 25 mg for HFrEF.  I advised patient to switch Losartan 25 mg to Entresto 24-26 mg BID.  8/18/23 - Patient reports that last week visiting nurse noted LE edema.  She saw Dr. Mendez and Lasix was increased to 40 mg QD.  CXR showed small left pleural effusion.  ProBNP was elevated at 2957.  I advised patient to start Jardiance 10 mg for HFrEF.  I will consider switching Losartan 25 mg to Entresto.  FU 1 month.   8/11/23 with Dr. Mendez - She reports SOB on exertion over the past few weeks. She restarted Lasix 20mg PO daily with some improvement. She has no CP, headache, fatigue, dizziness, palpitations, or syncope. She was told by visiting nurse yesterday that she has LE edema and crackles.  Dr. Mendez advised pt to increase Lasix to 40 mg QD and have a CXR.  7/21/23 - Patient reports worsening BP 80-90s yesterday with HR 40s. She denies dizziness or fatigue. She feels colder than usual. She is on Digoxin 0.125 mg every 3 days. She is on Losartan 25 mg. She is on Eliquis 2.5 mg BID. No s/s of bleeding. I advised patient to stop Digoxin 0.125 mg for bradycardia. She may need a PPM if she has recurrent PAF and needs a AV annika blocker, or if she has symptomatic bradycardia..  3/15/23 - Patient denies CP, SOB, palpitations, or lightheadedness. She is in A. Flutter. Patient has rales at base and trace edema. I advised patient to undergo an echocardiogram. I advised patient to increase dose of Simvastatin 40 mg  9/20/22- Patient is feeling well. Patient denies CP, SOB, palpitations, or lightheadedness. Patient recently stopped taking Lasix due to tinnitus and hair loss. She is on Simvastatin 20 mg for CAD. She is on Eliquis 2.5 mg BID for stroke prevention. She is on Losartan as needed. I advised patient to take Losartan 25 mg to prevent further cardiac hypertrophy. Patient denies SOB while supine, she has trace edema. She wishes to get echocardiogram at the next visit.  3/9/22 - Patient reports that she had ear discomfort when she took Eliquis twice daily. I advised patient to try resuming it twice daily to prevent stroke. Patient is feeling well. Patient reports that she takes Losartan and Digoxin as needed and skips if her BP gets below 100. 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 FU in 6 months.  11/16/21 - Patient reports leg pain and had received acupuncture treatments. I advised patient to get Venous Doppler. I advised patient to try supplementing with magnesium which would help with muscle cramps and also with constipation. I advised patient to undergo an echocardiogram. Patient underwent an echocardiogram and it showed moderate LV systolic dysfunction EF 35% with a normal bioprosthetic mitral valve.  7/27/21 - Patient called on 7/21/21 reporting that her HR was slow. I advised patient to stop Digoxin 0.125 mg. Patient reports that her HR is OK now and her BP is fine. Patient denies CP, SOB, palpitations, or lightheadedness.  6/2/21 - Patient has been stable. Patient denies CP or SOB. Patient reports occasional palpitations. Patient denies lightheadedness. Patient denies h/o syncope. Her ECG showed NSR, first degree AVB, RBBB, LAPB. I advised patient to start Losartan 25 mg for her LV dysfunction. FU 6 months.  12/2/20 - Patient has been stable. Patient denies CP, SOB, palpitations, or lightheadedness. She is on Digoxin 0.125 mg QOD. She is on Lasix 20 mg QD. She is on Eliquis 2.5 mg BID for stroke prevention. She is off ASA 81 mg. Patient denies bleeding. Patient reports that her BP at home is around 100-110 and her HR is around 70-80. Her HR is elevated in office. I advised patient to monitor her HR at home and if elevated she will contact me and I may increase Digoxin to QD.  8/5/20 - Patient feels well. She reports fast HR with exertion but is asymptomatic. Her BP is low. She is taking Metoprolol QOD. I advised patient that she may take it QD if HR is too fast. Patient denies CP. Patient denies SOB. She is not taking Aspirin due to easy bruising. She forgets to take Eliquis BID sometimes and not taking statins regularly. She is on Furosemide 20mg QD. I advised patient to take cholesterol medications and blood thinner as prescribed. I advised patient to undergo an echocardiogram. Fu in 4 months.

## 2024-03-30 NOTE — REVIEW OF SYSTEMS
[Dyspnea on exertion] : dyspnea during exertion [SOB] : shortness of breath [Lower Ext Edema] : lower extremity edema [Negative] : Heme/Lymph [Chest Discomfort] : no chest discomfort [Leg Claudication] : no intermittent leg claudication [Palpitations] : no palpitations [PND] : no PND [Orthopnea] : no orthopnea [Syncope] : no syncope

## 2024-03-30 NOTE — REASON FOR VISIT
[FreeTextEntry1] : 89 year-old female with CAD s/p MI, severe MR s/p bioprosthetic MVR on 8/4/15, HFrEF 35%, PAF, CKD (1.72), presents for followup.  Last seen on 12/20/23 - Patient has been stable.  Patient denies CP or SOB.  Patient denies LE edema.  Recent .  She is on Simvastatin 40 mg for CAD. She is on Eliquis 2.5 mg BID for stroke prevention.  She is on Jardiance 10 mg, Entresto 24-26 mg (taking QD only) for HFrEF.  She is OFF Lasix 20 mg.  I advised patient to switch Simvastatin 40 mg to Atorvastatin 40 mg.  Patient is reluctant but she will consider.  FU 3 months.   She is on Simvastatin 40 mg for CAD. She is on Eliquis 2.5 mg BID for stroke prevention. She is OFF Digoxin 0.125 mg.  She is on Jardiance 10 mg, Entresto 24-26 mg (taking QD only) for HFrEF.  She is OFF Lasix 20 mg.  10/12/23 ProBNP 2957.  Patient underwent an echocardiogram 3/15/23 and it showed moderate LV systolic dysfunction EF 35-40%, mildly elevated transmitral gradient (6 mmHg).  Patient underwent an echocardiogram 11/16/21 and it showed moderate LV systolic dysfunction EF 35% (6.6 cm/5.0 cm) with a normal bioprosthetic mitral valve.  Patient underwent an echocardiogram 8/5/20 and it showed moderate systolic LV dysfunction EF 35% (6.4 cm/5.1 cm) with a normal bioprosthetic mitral valve.  Patient underwent an echocardiogram 6/12/19 and it showed moderate systolic LV dysfunction EF 35% (6.4 cm/5.0 cm) with a normal bioprosthetic mitral valve.

## 2024-03-30 NOTE — PHYSICAL EXAM
[Well Developed] : well developed [Well Nourished] : well nourished [No Acute Distress] : no acute distress [No Carotid Bruit] : no carotid bruit [Normal Conjunctiva] : normal conjunctiva [Normal S1, S2] : normal S1, S2 [No Murmur] : no murmur [No Rub] : no rub [Good Air Entry] : good air entry [No Respiratory Distress] : no respiratory distress  [No Gallop] : no gallop [Non Tender] : non-tender [Soft] : abdomen soft [No Masses/organomegaly] : no masses/organomegaly [Normal Gait] : normal gait [Normal Bowel Sounds] : normal bowel sounds [No Varicosities] : no varicosities [No Cyanosis] : no cyanosis [No Clubbing] : no clubbing [No Rash] : no rash [Moves all extremities] : moves all extremities [No Skin Lesions] : no skin lesions [No Focal Deficits] : no focal deficits [Normal Speech] : normal speech [Normal memory] : normal memory [Alert and Oriented] : alert and oriented [de-identified] : Decreased BS mid-way up on left side and at the base on R side. Bibasilar crackles [de-identified] : mildly elevated JVD [de-identified] : Irregular, bradycardic [de-identified] : Trace LE edema

## 2024-05-06 RX ORDER — SACUBITRIL AND VALSARTAN 24; 26 MG/1; MG/1
24-26 TABLET, FILM COATED ORAL TWICE DAILY
Qty: 180 | Refills: 3 | Status: ACTIVE | COMMUNITY
Start: 2023-09-19 | End: 1900-01-01

## 2024-05-06 RX ORDER — EMPAGLIFLOZIN 10 MG/1
10 TABLET, FILM COATED ORAL DAILY
Qty: 90 | Refills: 3 | Status: ACTIVE | COMMUNITY
Start: 2023-08-18 | End: 1900-01-01

## 2024-08-04 ENCOUNTER — NON-APPOINTMENT (OUTPATIENT)
Age: 89
End: 2024-08-04

## 2024-08-05 ENCOUNTER — APPOINTMENT (OUTPATIENT)
Dept: CARDIOLOGY | Facility: CLINIC | Age: 89
End: 2024-08-05

## 2024-08-05 PROCEDURE — 99214 OFFICE O/P EST MOD 30 MIN: CPT

## 2024-08-05 PROCEDURE — 93306 TTE W/DOPPLER COMPLETE: CPT

## 2024-08-05 NOTE — REASON FOR VISIT
[FreeTextEntry1] : 89 year-old female with CAD s/p MI, severe MR s/p bioprosthetic MVR on 8/4/15, HFrEF 35%, PAF, CKD (1.72), presents for followup.  Last seen on 3/19/24 - Patient has been stable.  Patient denies CP, SOB, palpitations, or lightheadedness.  /68 HR 87.  Exam unremarkable.  I advised patient to continue current medications.  FU 6 months.   She is on Simvastatin 40 mg for CAD. She is on Eliquis 2.5 mg BID for stroke prevention.  She is on Jardiance 10 mg, Entresto 24-26 mg (taking 1/2 tab QD only) for HFrEF.  She is on Lasix 20 mg for LE edema.  She is OFF Digoxin 0.125 mg.    10/12/23 ProBNP 2957.  Patient underwent an echocardiogram 3/15/23 and it showed moderate LV systolic dysfunction EF 35-40%, mildly elevated transmitral gradient (6 mmHg).  Patient underwent an echocardiogram 11/16/21 and it showed moderate LV systolic dysfunction EF 35% (6.6 cm/5.0 cm) with a normal bioprosthetic mitral valve.  Patient underwent an echocardiogram 8/5/20 and it showed moderate systolic LV dysfunction EF 35% (6.4 cm/5.1 cm) with a normal bioprosthetic mitral valve.  Patient underwent an echocardiogram 6/12/19 and it showed moderate systolic LV dysfunction EF 35% (6.4 cm/5.0 cm) with a normal bioprosthetic mitral valve.

## 2024-08-05 NOTE — HISTORY OF PRESENT ILLNESS
[FreeTextEntry1] : 8/5/24 - Patient has been stable.  Patient reports lightheadedness with low BP.  Pt is taking Entresto 24-26 mg 1/2 tab QD because of low BP.  She is on Simvastatin 40 mg for CAD. She is on Eliquis 2.5 mg BID for stroke prevention.  She is on Jardiance 10 mg, Entresto 24-26 mg (taking 1/2 tab QD only) for HFrEF.  She is on Lasix 20 mg for LE edema.  104/62 HR 80.  Exam showed 2/6 LOLA at apex and along left sternal border.  No LE edema noted.  I advised patient to undergo an echocardiogram.  I advised patient to continue current medications.  FU 6 months.   3/19/24 - Patient has been stable.  Patient denies CP, SOB, palpitations, or lightheadedness.  /68 HR 87.  Exam unremarkable.  I advised patient to continue current medications.  FU 6 months.   12/20/23 - Patient has been stable.  Patient denies CP or SOB.  Patient denies LE edema.  Recent .  She is on Simvastatin 40 mg for CAD. She is on Eliquis 2.5 mg BID for stroke prevention.  She is on Jardiance 10 mg, Entresto 24-26 mg (taking QD only) for HFrEF.  She is OFF Lasix 20 mg.  I advised patient to switch Simvastatin 40 mg to Atorvastatin 40 mg.  Patient is reluctant but she will consider.  FU 3 months.   9/19/23 - Patient has been stable.  She is tolerating Jardiance 10 mg.  Patient reports one episode of  without palpitations.  Patient denies CP, SOB, or palpitations.  Patient denies LE edema.  She is on Simvastatin 40 mg for CAD. She is on Eliquis 2.5 mg BID for stroke prevention. She is OFF Digoxin 0.125 mg.  She is on Jardiance 10 mg, Lasix 20 mg, Losartan 25 mg for HFrEF.  I advised patient to switch Losartan 25 mg to Entresto 24-26 mg BID.  8/18/23 - Patient reports that last week visiting nurse noted LE edema.  She saw Dr. Mendez and Lasix was increased to 40 mg QD.  CXR showed small left pleural effusion.  ProBNP was elevated at 2957.  I advised patient to start Jardiance 10 mg for HFrEF.  I will consider switching Losartan 25 mg to Entresto.  FU 1 month.   8/11/23 with Dr. Mendez - She reports SOB on exertion over the past few weeks. She restarted Lasix 20mg PO daily with some improvement. She has no CP, headache, fatigue, dizziness, palpitations, or syncope. She was told by visiting nurse yesterday that she has LE edema and crackles.  Dr. Mendez advised pt to increase Lasix to 40 mg QD and have a CXR.  7/21/23 - Patient reports worsening BP 80-90s yesterday with HR 40s. She denies dizziness or fatigue. She feels colder than usual. She is on Digoxin 0.125 mg every 3 days. She is on Losartan 25 mg. She is on Eliquis 2.5 mg BID. No s/s of bleeding. I advised patient to stop Digoxin 0.125 mg for bradycardia. She may need a PPM if she has recurrent PAF and needs a AV annika blocker, or if she has symptomatic bradycardia..  3/15/23 - Patient denies CP, SOB, palpitations, or lightheadedness. She is in A. Flutter. Patient has rales at base and trace edema. I advised patient to undergo an echocardiogram. I advised patient to increase dose of Simvastatin 40 mg  9/20/22- Patient is feeling well. Patient denies CP, SOB, palpitations, or lightheadedness. Patient recently stopped taking Lasix due to tinnitus and hair loss. She is on Simvastatin 20 mg for CAD. She is on Eliquis 2.5 mg BID for stroke prevention. She is on Losartan as needed. I advised patient to take Losartan 25 mg to prevent further cardiac hypertrophy. Patient denies SOB while supine, she has trace edema. She wishes to get echocardiogram at the next visit.  3/9/22 - Patient reports that she had ear discomfort when she took Eliquis twice daily. I advised patient to try resuming it twice daily to prevent stroke. Patient is feeling well. Patient reports that she takes Losartan and Digoxin as needed and skips if her BP gets below 100. 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 FU in 6 months.  11/16/21 - Patient reports leg pain and had received acupuncture treatments. I advised patient to get Venous Doppler. I advised patient to try supplementing with magnesium which would help with muscle cramps and also with constipation. I advised patient to undergo an echocardiogram. Patient underwent an echocardiogram and it showed moderate LV systolic dysfunction EF 35% with a normal bioprosthetic mitral valve.  7/27/21 - Patient called on 7/21/21 reporting that her HR was slow. I advised patient to stop Digoxin 0.125 mg. Patient reports that her HR is OK now and her BP is fine. Patient denies CP, SOB, palpitations, or lightheadedness.  6/2/21 - Patient has been stable. Patient denies CP or SOB. Patient reports occasional palpitations. Patient denies lightheadedness. Patient denies h/o syncope. Her ECG showed NSR, first degree AVB, RBBB, LAPB. I advised patient to start Losartan 25 mg for her LV dysfunction. FU 6 months.  12/2/20 - Patient has been stable. Patient denies CP, SOB, palpitations, or lightheadedness. She is on Digoxin 0.125 mg QOD. She is on Lasix 20 mg QD. She is on Eliquis 2.5 mg BID for stroke prevention. She is off ASA 81 mg. Patient denies bleeding. Patient reports that her BP at home is around 100-110 and her HR is around 70-80. Her HR is elevated in office. I advised patient to monitor her HR at home and if elevated she will contact me and I may increase Digoxin to QD.  8/5/20 - Patient feels well. She reports fast HR with exertion but is asymptomatic. Her BP is low. She is taking Metoprolol QOD. I advised patient that she may take it QD if HR is too fast. Patient denies CP. Patient denies SOB. She is not taking Aspirin due to easy bruising. She forgets to take Eliquis BID sometimes and not taking statins regularly. She is on Furosemide 20mg QD. I advised patient to take cholesterol medications and blood thinner as prescribed. I advised patient to undergo an echocardiogram. Fu in 4 months.

## 2024-08-05 NOTE — PHYSICAL EXAM
[Well Developed] : well developed [No Acute Distress] : no acute distress [Well Nourished] : well nourished [Normal Conjunctiva] : normal conjunctiva [No Carotid Bruit] : no carotid bruit [Normal S1, S2] : normal S1, S2 [No Murmur] : no murmur [No Rub] : no rub [No Gallop] : no gallop [Good Air Entry] : good air entry [No Respiratory Distress] : no respiratory distress  [Soft] : abdomen soft [Non Tender] : non-tender [No Masses/organomegaly] : no masses/organomegaly [Normal Bowel Sounds] : normal bowel sounds [Normal Gait] : normal gait [No Cyanosis] : no cyanosis [No Clubbing] : no clubbing [No Varicosities] : no varicosities [No Rash] : no rash [No Skin Lesions] : no skin lesions [Moves all extremities] : moves all extremities [No Focal Deficits] : no focal deficits [Normal Speech] : normal speech [Alert and Oriented] : alert and oriented [Normal memory] : normal memory [de-identified] : mildly elevated JVD [de-identified] : Irregular, bradycardic [de-identified] : Decreased BS mid-way up on left side and at the base on R side. Bibasilar crackles [de-identified] : Trace LE edema

## 2024-08-05 NOTE — PHYSICAL EXAM
[Well Developed] : well developed [Well Nourished] : well nourished [No Acute Distress] : no acute distress [Normal Conjunctiva] : normal conjunctiva [Normal S1, S2] : normal S1, S2 [No Carotid Bruit] : no carotid bruit [No Murmur] : no murmur [No Rub] : no rub [No Gallop] : no gallop [Good Air Entry] : good air entry [No Respiratory Distress] : no respiratory distress  [Soft] : abdomen soft [Non Tender] : non-tender [No Masses/organomegaly] : no masses/organomegaly [Normal Bowel Sounds] : normal bowel sounds [Normal Gait] : normal gait [No Cyanosis] : no cyanosis [No Clubbing] : no clubbing [No Varicosities] : no varicosities [No Rash] : no rash [No Skin Lesions] : no skin lesions [Moves all extremities] : moves all extremities [No Focal Deficits] : no focal deficits [Normal Speech] : normal speech [Alert and Oriented] : alert and oriented [Normal memory] : normal memory [de-identified] : mildly elevated JVD [de-identified] : Irregular, bradycardic [de-identified] : Decreased BS mid-way up on left side and at the base on R side. Bibasilar crackles [de-identified] : Trace LE edema

## 2025-02-10 ENCOUNTER — APPOINTMENT (OUTPATIENT)
Dept: CARDIOLOGY | Facility: CLINIC | Age: 89
End: 2025-02-10
Payer: MEDICARE

## 2025-02-10 ENCOUNTER — NON-APPOINTMENT (OUTPATIENT)
Age: 89
End: 2025-02-10

## 2025-02-10 VITALS
HEART RATE: 91 BPM | WEIGHT: 112 LBS | BODY MASS INDEX: 21.16 KG/M2 | DIASTOLIC BLOOD PRESSURE: 68 MMHG | OXYGEN SATURATION: 98 % | SYSTOLIC BLOOD PRESSURE: 104 MMHG | RESPIRATION RATE: 16 BRPM

## 2025-02-10 DIAGNOSIS — I21.19 ST ELEVATION (STEMI) MYOCARDIAL INFARCTION INVOLVING OTHER CORONARY ARTERY OF INFERIOR WALL: ICD-10-CM

## 2025-02-10 DIAGNOSIS — R06.02 SHORTNESS OF BREATH: ICD-10-CM

## 2025-02-10 DIAGNOSIS — I48.0 PAROXYSMAL ATRIAL FIBRILLATION: ICD-10-CM

## 2025-02-10 DIAGNOSIS — Z95.2 PRESENCE OF PROSTHETIC HEART VALVE: ICD-10-CM

## 2025-02-10 DIAGNOSIS — I25.10 ATHEROSCLEROTIC HEART DISEASE OF NATIVE CORONARY ARTERY W/OUT ANGINA PECTORIS: ICD-10-CM

## 2025-02-10 DIAGNOSIS — I50.20 UNSPECIFIED SYSTOLIC (CONGESTIVE) HEART FAILURE: ICD-10-CM

## 2025-02-10 PROCEDURE — 99214 OFFICE O/P EST MOD 30 MIN: CPT

## 2025-02-11 LAB
ALBUMIN SERPL ELPH-MCNC: 3 G/DL
ALP BLD-CCNC: 111 U/L
ALT SERPL-CCNC: 10 U/L
ANION GAP SERPL CALC-SCNC: 9 MMOL/L
AST SERPL-CCNC: 36 U/L
BILIRUB SERPL-MCNC: 0.8 MG/DL
BUN SERPL-MCNC: 42 MG/DL
CALCIUM SERPL-MCNC: 8.6 MG/DL
CHLORIDE SERPL-SCNC: 102 MMOL/L
CO2 SERPL-SCNC: 26 MMOL/L
CREAT SERPL-MCNC: 1.69 MG/DL
EGFR: 28 ML/MIN/1.73M2
GLUCOSE SERPL-MCNC: 94 MG/DL
NT-PROBNP SERPL-MCNC: 2336 PG/ML
POTASSIUM SERPL-SCNC: 4.2 MMOL/L
PROT SERPL-MCNC: 7.3 G/DL
SODIUM SERPL-SCNC: 137 MMOL/L

## 2025-07-28 ENCOUNTER — APPOINTMENT (OUTPATIENT)
Dept: CARDIOLOGY | Facility: CLINIC | Age: 89
End: 2025-07-28
Payer: MEDICARE

## 2025-07-28 VITALS — DIASTOLIC BLOOD PRESSURE: 58 MMHG | SYSTOLIC BLOOD PRESSURE: 96 MMHG

## 2025-07-28 DIAGNOSIS — Z95.2 PRESENCE OF PROSTHETIC HEART VALVE: ICD-10-CM

## 2025-07-28 DIAGNOSIS — I48.91 UNSPECIFIED ATRIAL FIBRILLATION: ICD-10-CM

## 2025-07-28 DIAGNOSIS — I25.10 ATHEROSCLEROTIC HEART DISEASE OF NATIVE CORONARY ARTERY W/OUT ANGINA PECTORIS: ICD-10-CM

## 2025-07-28 DIAGNOSIS — I50.20 UNSPECIFIED SYSTOLIC (CONGESTIVE) HEART FAILURE: ICD-10-CM

## 2025-07-28 PROCEDURE — 99214 OFFICE O/P EST MOD 30 MIN: CPT

## 2025-07-28 PROCEDURE — 93306 TTE W/DOPPLER COMPLETE: CPT
